# Patient Record
Sex: MALE | Race: WHITE | NOT HISPANIC OR LATINO | Employment: OTHER | URBAN - NONMETROPOLITAN AREA
[De-identification: names, ages, dates, MRNs, and addresses within clinical notes are randomized per-mention and may not be internally consistent; named-entity substitution may affect disease eponyms.]

---

## 2020-10-28 ENCOUNTER — ALLIED HEALTH/NURSE VISIT (OUTPATIENT)
Dept: FAMILY MEDICINE | Facility: OTHER | Age: 65
End: 2020-10-28
Attending: FAMILY MEDICINE
Payer: MEDICARE

## 2020-10-28 DIAGNOSIS — M79.10 MUSCLE ACHE: Primary | ICD-10-CM

## 2020-10-28 PROCEDURE — U0003 INFECTIOUS AGENT DETECTION BY NUCLEIC ACID (DNA OR RNA); SEVERE ACUTE RESPIRATORY SYNDROME CORONAVIRUS 2 (SARS-COV-2) (CORONAVIRUS DISEASE [COVID-19]), AMPLIFIED PROBE TECHNIQUE, MAKING USE OF HIGH THROUGHPUT TECHNOLOGIES AS DESCRIBED BY CMS-2020-01-R: HCPCS | Mod: ZL | Performed by: FAMILY MEDICINE

## 2020-10-28 PROCEDURE — 99207 PR NO CHARGE NURSE ONLY: CPT

## 2020-10-28 PROCEDURE — C9803 HOPD COVID-19 SPEC COLLECT: HCPCS

## 2020-10-28 NOTE — PROGRESS NOTES
Patient swabbed for COVID-19 testing.  For cough and muscle aches.  Haley Couch LPN on 10/28/2020 at 11:40 AM

## 2020-10-29 ENCOUNTER — TELEPHONE (OUTPATIENT)
Dept: EMERGENCY MEDICINE | Facility: CLINIC | Age: 65
End: 2020-10-29

## 2020-10-29 LAB
SARS-COV-2 RNA SPEC QL NAA+PROBE: ABNORMAL
SPECIMEN SOURCE: ABNORMAL

## 2020-10-29 NOTE — TELEPHONE ENCOUNTER
"Coronavirus (COVID-19) Notification    Caller Name (Patient, parent, daughter/son, grandparent, etc)  Patient     Reason for call  Notify of Positive Coronavirus (COVID-19) lab results, assess symptoms,  review [facility] recommendations    Lab Result    Lab test:  2019-nCoV rRt-PCR or SARS-CoV-2 PCR    Oropharyngeal AND/OR nasopharyngeal swabs is POSITIVE for 2019-nCoV RNA/SARS-COV-2 PCR (COVID-19 virus)    RN Recommendations/Instructions per [facility] Coronavirus COVID-19 recommendations    Brief introduction script  Introduce self then review script:  \"I am calling on behalf of [employer].  We were notified that your Coronavirus test (COVID-19) for was POSITIVE for the virus.  I have some information to relay to you but first I wanted to mention that the [place] Dept of Health will be contacting you shortly [it's possible MD already called Patient] to talk to you more about how you are feeling and other people you have had contact with who might now also have the virus.  Also, [facility] is Partnering with the [facility] for Covid-19 research, you may be contacted directly by research staff.\"    Assessment (Inquire about Patient's current symptoms)   Assessment   Current Symptoms at time of phone call: (if no symptoms, document No symptoms]  weakness, cough, SOB   Symptoms onset (if applicable)  10/24/20     If at time of call, Patients symptoms hare worsened, the Patient should contact 911 or have someone drive them to Emergency Dept promptly:      If Patient calling 911, inform 911 personal that you have tested positive for the Coronavirus (COVID-19).  Place mask on and await 911 to arrive.    If Emergency Dept, If possible, please have another adult drive you to the Emergency Dept but you need to wear mask when in contact with other people.      Review information with Patient    Your result was positive. This means you have COVID-19 (coronavirus).  We have sent you a letter that " reviews the information that I'll be reviewing with you now.    How can I protect others?    If you have symptoms: stay home and away from others (self-isolate) until:    You've had no fever--and no medicine that reduces fever--for 1 full day (24 hours). And       Your other symptoms have gotten better. For example, your cough or breathing has improved. And     At least 10 days have passed since your symptoms started. (If you've been told by a doctor that you have a weak immune system, wait 20 days.)     If you don't have symptoms: Stay home and away from others (self-isolate) until at least 10 days have passed since your first positive COVID-19 test. (Date test collected)    During this time:    Stay in your own room, including for meals. Use your own bathroom if you can.    Stay away from others in your home. No hugging, kissing or shaking hands. No visitors.     Don't go to work, school or anywhere else.     Clean  high touch  surfaces often (doorknobs, counters, handles, etc.). Use a household cleaning spray or wipes. You'll find a full list on the EPA website at www.epa.gov/pesticide-registration/list-n-disinfectants-use-against-sars-cov-2.     Cover your mouth and nose with a mask, tissue or other face covering to avoid spreading germs.    Wash your hands and face often with soap and water.    Caregivers in these groups are at risk for severe illness due to COVID-19:  o People 65 years and older  o People who live in a nursing home or long-term care facility  o People with chronic disease (lung, heart, cancer, diabetes, kidney, liver, immunologic)  o People who have a weakened immune system, including those who:  - Are in cancer treatment  - Take medicine that weakens the immune system, such as corticosteroids  - Had a bone marrow or organ transplant  - Have an immune deficiency  - Have poorly controlled HIV or AIDS  - Are obese (body mass index of 40 or higher)  - Smoke regularly    Caregivers should wear  gloves while washing dishes, handling laundry and cleaning bedrooms and bathrooms.    Wash and dry laundry with special caution. Don't shake dirty laundry, and use the warmest water setting you can.    If you have a weakened immune system, ask your doctor about other actions you should take.    For more tips, go to www.cdc.gov/coronavirus/2019-ncov/downloads/10Things.pdf.    You should not go back to work until you meet the guidelines above for ending your home isolation. You don't need to be retested for COVID-19 before going back to work--studies show that you won't spread the virus if it's been at least 10 days since your symptoms started (or 20 days, if you have a weak immune system).    Employers: This document serves as formal notice of your employee's medical guidelines for going back to work. They must meet the above guidelines before going back to work in person.    How can I take care of myself?    1. Get lots of rest. Drink extra fluids (unless a doctor has told you not to).    2. Take Tylenol (acetaminophen) for fever or pain. If you have liver or kidney problems, ask your family doctor if it's okay to take Tylenol.     Take either:     650 mg (two 325 mg pills) every 4 to 6 hours, or     1,000 mg (two 500 mg pills) every 8 hours as needed.     Note: Don't take more than 3,000 mg in one day. Acetaminophen is found in many medicines (both prescribed and over-the-counter medicines). Read all labels to be sure you don't take too much.    For children, check the Tylenol bottle for the right dose (based on their age or weight).    3. If you have other health problems (like cancer, heart failure, an organ transplant or severe kidney disease): Call your specialty clinic if you don't feel better in the next 2 days.    4. Know when to call 911: Emergency warning signs include:    Trouble breathing or shortness of breath    Pain or pressure in the chest that doesn't go away    Feeling confused like you haven't  felt before, or not being able to wake up    Bluish-colored lips or face    5. Sign up for [employer]. We know it's scary to hear that you have COVID-19. We want to track your symptoms to make sure you're okay over the next 2 weeks. Please look for an email from [employer]--this is a free, online program that we'll use to keep in touch. To sign up, follow the link in the email. Learn more at [email or web address].    Where can I get more information?    [facility]: [email or web address]    Coronavirus Basics: www.health.[facility].[place]./diseases/coronavirus/basics.html    What to Do If You're Sick: www.cdc.gov/coronavirus/2019-ncov/about/steps-when-sick.html    Ending Home Isolation: www.cdc.gov/coronavirus/2019-ncov/hcp/disposition-in-home-patients.html     Caring for Someone with COVID-19: www.cdc.gov/coronavirus/2019-ncov/if-you-are-sick/care-for-someone.html     [facility] clinical trials (COVID-19 research studies): [email or web address].[email or web address]-clinical-trials     A Positive COVID-19 letter will be sent via [employer] or the mail. (Exception, no letters sent to Presurgerical/Preprocedure Patients)    [Name]  Michelle Chino RN  [facility]  COVID19 Results Team RN  Ph# [phone]

## 2020-11-01 ENCOUNTER — NURSE TRIAGE (OUTPATIENT)
Dept: NURSING | Facility: CLINIC | Age: 65
End: 2020-11-01

## 2020-11-01 ENCOUNTER — HOSPITAL ENCOUNTER (EMERGENCY)
Facility: OTHER | Age: 65
Discharge: HOME | DRG: 177 | End: 2020-11-01
Attending: PHYSICIAN ASSISTANT | Admitting: PHYSICIAN ASSISTANT
Payer: MEDICARE

## 2020-11-01 ENCOUNTER — APPOINTMENT (OUTPATIENT)
Dept: GENERAL RADIOLOGY | Facility: OTHER | Age: 65
DRG: 177 | End: 2020-11-01
Attending: PHYSICIAN ASSISTANT
Payer: MEDICARE

## 2020-11-01 VITALS
HEART RATE: 81 BPM | WEIGHT: 220 LBS | OXYGEN SATURATION: 90 % | HEIGHT: 66 IN | RESPIRATION RATE: 22 BRPM | BODY MASS INDEX: 35.36 KG/M2 | SYSTOLIC BLOOD PRESSURE: 122 MMHG | TEMPERATURE: 98.2 F | DIASTOLIC BLOOD PRESSURE: 88 MMHG

## 2020-11-01 DIAGNOSIS — U07.1 2019 NOVEL CORONAVIRUS DISEASE (COVID-19): ICD-10-CM

## 2020-11-01 DIAGNOSIS — J18.9 PNEUMONIA: ICD-10-CM

## 2020-11-01 LAB
ANION GAP SERPL CALCULATED.3IONS-SCNC: 8 MMOL/L (ref 3–14)
APTT PPP: 32 SEC (ref 22–37)
BASOPHILS # BLD AUTO: 0 10E9/L (ref 0–0.2)
BASOPHILS NFR BLD AUTO: 0.6 %
BUN SERPL-MCNC: 13 MG/DL (ref 7–25)
CALCIUM SERPL-MCNC: 8.2 MG/DL (ref 8.6–10.3)
CHLORIDE SERPL-SCNC: 100 MMOL/L (ref 98–107)
CO2 SERPL-SCNC: 27 MMOL/L (ref 21–31)
CREAT SERPL-MCNC: 0.9 MG/DL (ref 0.7–1.3)
D DIMER PPP FEU-MCNC: 0.5 UG/ML FEU (ref 0–0.5)
DIFFERENTIAL METHOD BLD: NORMAL
EOSINOPHIL # BLD AUTO: 0 10E9/L (ref 0–0.7)
EOSINOPHIL NFR BLD AUTO: 0.1 %
ERYTHROCYTE [DISTWIDTH] IN BLOOD BY AUTOMATED COUNT: 12.6 % (ref 10–15)
GFR SERPL CREATININE-BSD FRML MDRD: 84 ML/MIN/{1.73_M2}
GLUCOSE SERPL-MCNC: 108 MG/DL (ref 70–105)
HCT VFR BLD AUTO: 42.7 % (ref 40–53)
HGB BLD-MCNC: 14.7 G/DL (ref 13.3–17.7)
IMM GRANULOCYTES # BLD: 0 10E9/L (ref 0–0.4)
IMM GRANULOCYTES NFR BLD: 0.6 %
INR PPP: 1.03 (ref 0.86–1.14)
LYMPHOCYTES # BLD AUTO: 1.8 10E9/L (ref 0.8–5.3)
LYMPHOCYTES NFR BLD AUTO: 26.7 %
MCH RBC QN AUTO: 32.7 PG (ref 26.5–33)
MCHC RBC AUTO-ENTMCNC: 34.4 G/DL (ref 31.5–36.5)
MCV RBC AUTO: 95 FL (ref 78–100)
MONOCYTES # BLD AUTO: 0.5 10E9/L (ref 0–1.3)
MONOCYTES NFR BLD AUTO: 6.5 %
NEUTROPHILS # BLD AUTO: 4.5 10E9/L (ref 1.6–8.3)
NEUTROPHILS NFR BLD AUTO: 65.5 %
PLATELET # BLD AUTO: 182 10E9/L (ref 150–450)
POTASSIUM SERPL-SCNC: 3.3 MMOL/L (ref 3.5–5.1)
RBC # BLD AUTO: 4.49 10E12/L (ref 4.4–5.9)
SODIUM SERPL-SCNC: 135 MMOL/L (ref 134–144)
TROPONIN I SERPL-MCNC: 12.2 PG/ML
WBC # BLD AUTO: 6.9 10E9/L (ref 4–11)

## 2020-11-01 PROCEDURE — 93005 ELECTROCARDIOGRAM TRACING: CPT | Performed by: PHYSICIAN ASSISTANT

## 2020-11-01 PROCEDURE — 80048 BASIC METABOLIC PNL TOTAL CA: CPT | Performed by: PHYSICIAN ASSISTANT

## 2020-11-01 PROCEDURE — 250N000013 HC RX MED GY IP 250 OP 250 PS 637: Performed by: PHYSICIAN ASSISTANT

## 2020-11-01 PROCEDURE — 85025 COMPLETE CBC W/AUTO DIFF WBC: CPT | Performed by: PHYSICIAN ASSISTANT

## 2020-11-01 PROCEDURE — 85610 PROTHROMBIN TIME: CPT | Performed by: PHYSICIAN ASSISTANT

## 2020-11-01 PROCEDURE — 99283 EMERGENCY DEPT VISIT LOW MDM: CPT | Performed by: PHYSICIAN ASSISTANT

## 2020-11-01 PROCEDURE — 85379 FIBRIN DEGRADATION QUANT: CPT | Performed by: PHYSICIAN ASSISTANT

## 2020-11-01 PROCEDURE — 258N000003 HC RX IP 258 OP 636: Performed by: PHYSICIAN ASSISTANT

## 2020-11-01 PROCEDURE — 84484 ASSAY OF TROPONIN QUANT: CPT | Performed by: PHYSICIAN ASSISTANT

## 2020-11-01 PROCEDURE — 96375 TX/PRO/DX INJ NEW DRUG ADDON: CPT | Performed by: PHYSICIAN ASSISTANT

## 2020-11-01 PROCEDURE — 85730 THROMBOPLASTIN TIME PARTIAL: CPT | Performed by: PHYSICIAN ASSISTANT

## 2020-11-01 PROCEDURE — 71045 X-RAY EXAM CHEST 1 VIEW: CPT

## 2020-11-01 PROCEDURE — 93010 ELECTROCARDIOGRAM REPORT: CPT | Performed by: INTERNAL MEDICINE

## 2020-11-01 PROCEDURE — 36415 COLL VENOUS BLD VENIPUNCTURE: CPT | Performed by: PHYSICIAN ASSISTANT

## 2020-11-01 PROCEDURE — 96365 THER/PROPH/DIAG IV INF INIT: CPT | Performed by: PHYSICIAN ASSISTANT

## 2020-11-01 PROCEDURE — 99285 EMERGENCY DEPT VISIT HI MDM: CPT | Mod: 25 | Performed by: PHYSICIAN ASSISTANT

## 2020-11-01 PROCEDURE — 250N000011 HC RX IP 250 OP 636: Performed by: PHYSICIAN ASSISTANT

## 2020-11-01 RX ORDER — LOSARTAN POTASSIUM AND HYDROCHLOROTHIAZIDE 12.5; 1 MG/1; MG/1
1 TABLET ORAL DAILY
COMMUNITY

## 2020-11-01 RX ORDER — CEFTRIAXONE SODIUM 1 G/50ML
1 INJECTION, SOLUTION INTRAVENOUS ONCE
Status: COMPLETED | OUTPATIENT
Start: 2020-11-01 | End: 2020-11-01

## 2020-11-01 RX ORDER — AZITHROMYCIN 250 MG/1
250 TABLET, FILM COATED ORAL DAILY
Qty: 6 TABLET | Refills: 0 | Status: ON HOLD | OUTPATIENT
Start: 2020-11-01 | End: 2020-11-08

## 2020-11-01 RX ORDER — ATENOLOL 50 MG/1
50 TABLET ORAL DAILY
COMMUNITY

## 2020-11-01 RX ORDER — DEXAMETHASONE SODIUM PHOSPHATE 10 MG/ML
6 INJECTION, SOLUTION INTRAMUSCULAR; INTRAVENOUS ONCE
Status: COMPLETED | OUTPATIENT
Start: 2020-11-01 | End: 2020-11-01

## 2020-11-01 RX ORDER — AZITHROMYCIN 250 MG/1
500 TABLET, FILM COATED ORAL ONCE
Status: COMPLETED | OUTPATIENT
Start: 2020-11-01 | End: 2020-11-01

## 2020-11-01 RX ADMIN — AZITHROMYCIN MONOHYDRATE 500 MG: 250 TABLET ORAL at 13:54

## 2020-11-01 RX ADMIN — SODIUM CHLORIDE 1000 ML: 9 INJECTION, SOLUTION INTRAVENOUS at 13:45

## 2020-11-01 RX ADMIN — CEFTRIAXONE SODIUM 1 G: 1 INJECTION, SOLUTION INTRAVENOUS at 13:48

## 2020-11-01 RX ADMIN — DEXAMETHASONE SODIUM PHOSPHATE 6 MG: 10 INJECTION, SOLUTION INTRAMUSCULAR; INTRAVENOUS at 13:53

## 2020-11-01 ASSESSMENT — ENCOUNTER SYMPTOMS
BRUISES/BLEEDS EASILY: 0
FEVER: 0
SHORTNESS OF BREATH: 1
CONFUSION: 0
HEMATURIA: 0
CHILLS: 0
ADENOPATHY: 0
WOUND: 0
CHEST TIGHTNESS: 0
ABDOMINAL PAIN: 0
BACK PAIN: 0

## 2020-11-01 ASSESSMENT — MIFFLIN-ST. JEOR: SCORE: 1720.66

## 2020-11-01 NOTE — TELEPHONE ENCOUNTER
Reason for Disposition    MODERATE difficulty breathing (e.g., speaks in phrases, SOB even at rest, pulse 100-120)    Additional Information    Negative: SEVERE difficulty breathing (e.g., struggling for each breath, speaks in single words)    Negative: Difficult to awaken or acting confused (e.g., disoriented, slurred speech)    Negative: Bluish (or gray) lips or face now    Negative: Shock suspected (e.g., cold/pale/clammy skin, too weak to stand, low BP, rapid pulse)    Negative: Sounds like a life-threatening emergency to the triager    Negative: [1] COVID-19 exposure AND [2] no symptoms    Negative: COVID-19 and Breastfeeding, questions about    Negative: [1] Adult with possible COVID-19 symptoms AND [2] triager concerned about severity of symptoms or other causes    Negative: SEVERE or constant chest pain or pressure (Exception: mild central chest pain, present only when coughing)     [facility] [place] Specific Disposition  - [facility] Specific Patient Instructions  COVID 19 Nurse Triage Plan/Patient Instructions    Please be aware that novel coronavirus (COVID-19) may be circulating in the community. If you develop symptoms such as fever, cough, or SOB or if you have concerns about the presence of another infection including coronavirus (COVID-19), please contact your health care provider or visit [email or web address].       ED Visit recommended. Follow protocol based instructions.     Bring Your Own Device:  Please also bring your smart device(s) (smart phones, tablets, laptops) and their charging cables for your personal use and to communicate with your care team during your visit.    Thank you for taking steps to prevent the spread of this virus.  Limit your contact with others.  Wear a simple mask to cover your cough.  Wash your hands well and often.    Resources  [facility]: About COVID-19: [email or web address]  CDC: What to Do If You're Sick:  "www.cdc.gov/coronavirus/2019-ncov/about/steps-when-sick.html  CDC: Ending Home Isolation: www.cdc.gov/coronavirus/2019-ncov/hcp/disposition-in-home-patients.html   CDC: Caring for Someone: www.cdc.gov/coronavirus/2019-ncov/if-you-are-sick/care-for-someone.html   [facility]: Interim Guidance for Hospital Discharge to Home: www.health.[facility].[place]./diseases/coronavirus/hcp/hospdischarge.pdf  [facility] clinical trials (COVID-19 research studies): clinicalaffairs.[facility]-clinical-trials   Below are the COVID-19 hotlines at the [place] Department of Health ([facility]). Interpreters are available.   For health questions: Call 140-825-3730 or 1-179.728.1444 (7 a.m. to 7 p.m.)  For questions about schools and childcare: Call 595-260-2025 or 1-150.904.6701 (7 a.m. to 7 p.m.)    Answer Assessment - Initial Assessment Questions  1. COVID-19 DIAGNOSIS: \"Who made your Coronavirus (COVID-19) diagnosis?\" \"Was it confirmed by a positive lab test?\" If not diagnosed by a HCP, ask \"Are there lots of cases (community spread) where you live?\" (See public health department website, if unsure)      positve  2. ONSET: \"When did the COVID-19 symptoms start?\"       Few days  3. WORST SYMPTOM: \"What is your worst symptom?\" (e.g., cough, fever, shortness of breath, muscle aches)      Short of breath  4. COUGH: \"Do you have a cough?\" If so, ask: \"How bad is the cough?\"        yes  5. FEVER: \"Do you have a fever?\" If so, ask: \"What is your temperature, how was it measured, and when did it start?\"      yes  6. RESPIRATORY STATUS: \"Describe your breathing?\" (e.g., shortness of breath, wheezing, unable to speak)      short of breath, speaking without difficulty, but describes worsening symptoms  7. BETTER-SAME-WORSE: \"Are you getting better, staying the same or getting worse compared to yesterday?\"  If getting worse, ask, \"In what way?\"      worse  8. HIGH RISK DISEASE: \"Do you have any chronic medical problems?\" (e.g., asthma, heart or lung " "disease, weak immune system, etc.)      yes  9. PREGNANCY: \"Is there any chance you are pregnant?\" \"When was your last menstrual period?\"      no  10. OTHER SYMPTOMS: \"Do you have any other symptoms?\"  (e.g., chills, fatigue, headache, loss of smell or taste, muscle pain, sore throat)        no    Protocols used: CORONAVIRUS (COVID-19) DIAGNOSED OR [id number] 8.4.20      "

## 2020-11-01 NOTE — ED TRIAGE NOTES
"ED Nursing Triage Note (General)   ________________________________    Remington Cook is a 65 year old Male that presents to triage private car  With history of  Possitive Covid reported by patient   Significant symptoms had onset Nov 28. Increased SOB reported and difficulty sleeping due to breathing. Also reports cough and mild weakness. No hx of respiratory concerns.  Eating and drinking fine.,  /88   Pulse 83   Temp 98.2  F (36.8  C) (Tympanic)   Resp 22   Ht 1.676 m (5' 6\")   Wt 99.8 kg (220 lb)   SpO2 92%   BMI 35.51 kg/m  t  Patient appears alert  and oriented, in no acute distress., and cooperative and calm behavior.    GCS 15  Airway: intact  Breathing noted as Normal.  Circulation Normal  Skin normal        "

## 2020-11-01 NOTE — ED AVS SNAPSHOT
[facility]  [address]  [place] [address]  Phone: [phone]  Fax: [phone]                                    Remington Cook   MRN: [id number]    Department: [facility]   Date of Visit: 11/1/2020           After Visit Summary Signature Page    I have received my discharge instructions, and my questions have been answered. I have discussed any challenges I see with this plan with the nurse or doctor.    ..........................................................................................................................................  Patient/Patient Representative Signature      ..........................................................................................................................................  Patient Representative Print Name and Relationship to Patient    ..................................................               ................................................  Date                                   Time    ..........................................................................................................................................  Reviewed by Signature/Title    ...................................................              ..............................................  Date                                               Time          22EPIC Rev 08/18

## 2020-11-01 NOTE — DISCHARGE INSTRUCTIONS
Get plenty of fluids and rest.  As we discussed all of your lab work and vital signs appear very well as does your physical exam.  However, your chest x-ray did show pneumonia.  It is difficult to determine if this is from the coronavirus or if there may be an underlying bacterial pneumonia.  We will treat you with a Z-Jim.  You are given the first dose in the emergency department which is 500 mg.  You should only take 250 mg, 1 tablet/day starting 11/2 for a total of 4 days.  This will ED with 2 doses left over.  Currently things look well but if your symptoms are greatly worsening or concerning please return for further evaluation, I would expect your Covid symptoms to be present for approximately 2 weeks.

## 2020-11-01 NOTE — ED PROVIDER NOTES
"  History     Chief Complaint   Patient presents with     Shortness of Breath     HPI  Remington Cook is a 65 year old male who presents to the ED for evaluation of SOB. Reporting a dry nonproductive cough over the last few days, just had a returned positive COVID test. He says he wakes up in the middle of the night feeling a little more SOB than usual. Unknown fevers. No chest pain, abd pain, dysuria. Still eating and drinking appropriately.     Allergies:  No Known Allergies    Problem List:    There are no active problems to display for this patient.       Past Medical History:    History reviewed. No pertinent past medical history.    Past Surgical History:    History reviewed. No pertinent surgical history.    Family History:    History reviewed. No pertinent family history.    Social History:  Marital Status:  Single [1]  Social History     Tobacco Use     Smoking status: Former Smoker     Smokeless tobacco: Never Used   Substance Use Topics     Alcohol use: Not Currently     Drug use: Not Currently        Medications:         atenolol (TENORMIN) 50 MG tablet       azithromycin (ZITHROMAX) 250 MG tablet       losartan-hydrochlorothiazide (HYZAAR) 100-12.5 MG tablet          Review of Systems   Constitutional: Negative for chills and fever.   HENT: Negative for congestion.    Eyes: Negative for visual disturbance.   Respiratory: Positive for shortness of breath. Negative for chest tightness.    Cardiovascular: Negative for chest pain.   Gastrointestinal: Negative for abdominal pain.   Genitourinary: Negative for hematuria.   Musculoskeletal: Negative for back pain.   Skin: Negative for rash and wound.   Neurological: Negative for syncope.   Hematological: Negative for adenopathy. Does not bruise/bleed easily.   Psychiatric/Behavioral: Negative for confusion.       Physical Exam   BP: 122/88  Pulse: 83  Temp: 98.2  F (36.8  C)  Resp: 22  Height: 167.6 cm (5' 6\")  Weight: 99.8 kg (220 lb)  SpO2: 92 %      Physical " Exam  Constitutional:       General: He is not in acute distress.     Appearance: He is well-developed. He is not diaphoretic.   HENT:      Head: Normocephalic and atraumatic.   Eyes:      General: No scleral icterus.     Conjunctiva/sclera: Conjunctivae normal.   Neck:      Musculoskeletal: Neck supple.   Cardiovascular:      Rate and Rhythm: Normal rate and regular rhythm.   Pulmonary:      Effort: Pulmonary effort is normal.      Breath sounds: Normal breath sounds. No decreased breath sounds, wheezing, rhonchi or rales.   Abdominal:      Palpations: Abdomen is soft.      Tenderness: There is no abdominal tenderness.   Musculoskeletal:         General: No deformity.   Lymphadenopathy:      Cervical: No cervical adenopathy.   Skin:     General: Skin is warm and dry.      Findings: No rash.   Neurological:      Mental Status: He is alert and oriented to person, place, and time. Mental status is at baseline.   Psychiatric:         Mood and Affect: Mood normal.         Behavior: Behavior normal.         ED Course        Procedures          EKG read at 1341.  Heart rate 78, normal sinus rhythm, no ST changes.    Critical Care time:  none               Results for orders placed or performed during the hospital encounter of 11/01/20 (from the past 24 hour(s))   CBC with platelets differential   Result Value Ref Range    WBC 6.9 4.0 - 11.0 10e9/L    RBC Count 4.49 4.4 - 5.9 10e12/L    Hemoglobin 14.7 13.3 - 17.7 g/dL    Hematocrit 42.7 40.0 - 53.0 %    MCV 95 78 - 100 fl    MCH 32.7 26.5 - 33.0 pg    MCHC 34.4 31.5 - 36.5 g/dL    RDW 12.6 10.0 - 15.0 %    Platelet Count 182 150 - 450 10e9/L    Diff Method Automated Method     % Neutrophils 65.5 %    % Lymphocytes 26.7 %    % Monocytes 6.5 %    % Eosinophils 0.1 %    % Basophils 0.6 %    % Immature Granulocytes 0.6 %    Absolute Neutrophil 4.5 1.6 - 8.3 10e9/L    Absolute Lymphocytes 1.8 0.8 - 5.3 10e9/L    Absolute Monocytes 0.5 0.0 - 1.3 10e9/L    Absolute Eosinophils  0.0 0.0 - 0.7 10e9/L    Absolute Basophils 0.0 0.0 - 0.2 10e9/L    Abs Immature Granulocytes 0.0 0 - 0.4 10e9/L   Basic metabolic panel   Result Value Ref Range    Sodium 135 134 - 144 mmol/L    Potassium 3.3 (L) 3.5 - 5.1 mmol/L    Chloride 100 98 - 107 mmol/L    Carbon Dioxide 27 21 - 31 mmol/L    Anion Gap 8 3 - 14 mmol/L    Glucose 108 (H) 70 - 105 mg/dL    Urea Nitrogen 13 7 - 25 mg/dL    Creatinine 0.90 0.70 - 1.30 mg/dL    GFR Estimate 84 >60 mL/min/[1.73_m2]    GFR Estimate If Black >90 >60 mL/min/[1.73_m2]    Calcium 8.2 (L) 8.6 - 10.3 mg/dL   Troponin GH   Result Value Ref Range    Troponin 12.2 <34.0 pg/mL   D dimer quantitative   Result Value Ref Range    D Dimer 0.5 0.0 - 0.50 ug/ml FEU   INR   Result Value Ref Range    INR 1.03 0.86 - 1.14   Partial thromboplastin time   Result Value Ref Range    PTT 32 22 - 37 sec   XR Chest Port 1 View    Narrative    PROCEDURE:  XR CHEST PORT 1 VW    HISTORY:  + covid, inc sob, cough.     COMPARISON:  None.    FINDINGS:   The heart is borderline in size. The pulmonary vasculature is normal.   There are abnormal areas of increased density in both lungs suspicious  for pneumonia. No pleural effusion or pneumothorax.      Impression    IMPRESSION:  Bilateral pulmonary infiltrates consistent with pneumonia       DAVID LEWIS MD       Medications   cefTRIAXone in d5w (ROCEPHIN) intermittent infusion 1 g (0 g Intravenous Stopped 11/1/20 1428)   azithromycin (ZITHROMAX) tablet 500 mg (500 mg Oral Given 11/1/20 1354)   dexamethasone PF (DECADRON) injection 6 mg (6 mg Intravenous Given 11/1/20 1353)   0.9% sodium chloride BOLUS (0 mLs Intravenous Stopped 11/1/20 1428)       Assessments & Plan (with Medical Decision Making)   Pt nontoxic in NAD. Heart, lung, bowel sounds normal, abd soft, nontender to palpation, nondistended. VSS, afebrile. He is COVID +.    He has very well-appearing lab work with no leukocytosis, normal hemoglobin.  He has a negative troponin, normal  D-dimer.  BMP is unremarkable.    Chest x-ray is read as Bilateral pulmonary infiltrates consistent with pneumonia.    Overall I am encouraged by the well appearance of the patient as well as his stable vital signs and reassuring diagnostic studies.  He is not requiring any supplemental oxygen in the emergency department.  At this time I do not see any benefit from being admitted to the hospital.    Patient was given Rocephin and will be sent home on a Z-Jim.  He was given a dose of dexamethasone.    Strict return precautions are given to the pt, they will return if symptoms are worsening or concerning. The pt understands and agrees with the plan and they are discharged.     Tin Cast PA-C    I have reviewed the nursing notes.    I have reviewed the findings, diagnosis, plan and need for follow up with the patient.       Discharge Medication List as of 11/1/2020  2:29 PM      START taking these medications    Details   azithromycin (ZITHROMAX) 250 MG tablet Take 1 tablet (250 mg) by mouth daily Take 1 tablet per day. 250mg/day starting on 11/2, take for 4 days. You will have 2 left over pills., Disp-6 tablet, R-0, [facility]             Final diagnoses:   2019 novel coronavirus disease (COVID-19)   Pneumonia       11/1/2020   [facility]     Tin Cast PA  11/01/20 [id number]

## 2020-11-02 LAB — INTERPRETATION ECG - MUSE: NORMAL

## 2020-11-03 ENCOUNTER — HOSPITAL ENCOUNTER (INPATIENT)
Facility: OTHER | Age: 65
LOS: 5 days | Discharge: HOME | DRG: 177 | End: 2020-11-08
Attending: FAMILY MEDICINE | Admitting: FAMILY MEDICINE
Payer: MEDICARE

## 2020-11-03 ENCOUNTER — APPOINTMENT (OUTPATIENT)
Dept: GENERAL RADIOLOGY | Facility: OTHER | Age: 65
DRG: 177 | End: 2020-11-03
Attending: FAMILY MEDICINE
Payer: MEDICARE

## 2020-11-03 DIAGNOSIS — E66.9 OBESITY, UNSPECIFIED CLASSIFICATION, UNSPECIFIED OBESITY TYPE, UNSPECIFIED WHETHER SERIOUS COMORBIDITY PRESENT: ICD-10-CM

## 2020-11-03 DIAGNOSIS — U07.1 2019 NOVEL CORONAVIRUS DISEASE (COVID-19): Primary | ICD-10-CM

## 2020-11-03 DIAGNOSIS — Z79.899 NEED FOR PROPHYLACTIC CHEMOTHERAPY: ICD-10-CM

## 2020-11-03 DIAGNOSIS — U07.1 PNEUMONIA DUE TO 2019 NOVEL CORONAVIRUS: ICD-10-CM

## 2020-11-03 DIAGNOSIS — R09.02 HYPOXIA: ICD-10-CM

## 2020-11-03 DIAGNOSIS — Z87.891 PERSONAL HISTORY OF TOBACCO USE, PRESENTING HAZARDS TO HEALTH: ICD-10-CM

## 2020-11-03 DIAGNOSIS — Z20.822 COVID-19 RULED OUT: ICD-10-CM

## 2020-11-03 DIAGNOSIS — J12.82 PNEUMONIA DUE TO 2019 NOVEL CORONAVIRUS: ICD-10-CM

## 2020-11-03 DIAGNOSIS — E66.9 OBESITY (BMI 35.0-39.9 WITHOUT COMORBIDITY): ICD-10-CM

## 2020-11-03 DIAGNOSIS — R09.02 HYPOXEMIA: ICD-10-CM

## 2020-11-03 LAB
ABO + RH BLD: NORMAL
ABO + RH BLD: NORMAL
ALBUMIN SERPL-MCNC: 3.5 G/DL (ref 3.5–5.7)
ALBUMIN UR-MCNC: 10 MG/DL
ALP SERPL-CCNC: 53 U/L (ref 34–104)
ALT SERPL W P-5'-P-CCNC: 35 U/L (ref 7–52)
ANION GAP SERPL CALCULATED.3IONS-SCNC: 10 MMOL/L (ref 3–14)
APPEARANCE UR: CLEAR
APTT PPP: 31 SEC (ref 22–37)
APTT PPP: 31 SEC (ref 22–37)
AST SERPL W P-5'-P-CCNC: 49 U/L (ref 13–39)
BASOPHILS # BLD AUTO: 0 10E9/L (ref 0–0.2)
BASOPHILS NFR BLD AUTO: 0.2 %
BILIRUB SERPL-MCNC: 0.6 MG/DL (ref 0.3–1)
BILIRUB UR QL STRIP: NEGATIVE
BUN SERPL-MCNC: 15 MG/DL (ref 7–25)
CALCIUM SERPL-MCNC: 8.8 MG/DL (ref 8.6–10.3)
CHLORIDE SERPL-SCNC: 100 MMOL/L (ref 98–107)
CK SERPL-CCNC: 279 U/L (ref 30–223)
CO2 SERPL-SCNC: 27 MMOL/L (ref 21–31)
COLOR UR AUTO: ABNORMAL
CREAT SERPL-MCNC: 0.95 MG/DL (ref 0.7–1.3)
CRP SERPL-MCNC: 63.2 MG/L
D DIMER PPP FEU-MCNC: 0.5 UG/ML FEU (ref 0–0.5)
DIFFERENTIAL METHOD BLD: ABNORMAL
EOSINOPHIL # BLD AUTO: 0 10E9/L (ref 0–0.7)
EOSINOPHIL NFR BLD AUTO: 0.1 %
ERYTHROCYTE [DISTWIDTH] IN BLOOD BY AUTOMATED COUNT: 12.5 % (ref 10–15)
FERRITIN SERPL-MCNC: 1340 NG/ML (ref 23.9–336.2)
FIBRINOGEN PPP-MCNC: 693 MG/DL (ref 200–420)
GFR SERPL CREATININE-BSD FRML MDRD: 79 ML/MIN/{1.73_M2}
GLUCOSE SERPL-MCNC: 105 MG/DL (ref 70–105)
GLUCOSE UR STRIP-MCNC: NEGATIVE MG/DL
HCO3 BLD-SCNC: 26 MMOL/L (ref 21–28)
HCO3 BLDV-SCNC: 28 MMOL/L (ref 21–28)
HCT VFR BLD AUTO: 43.4 % (ref 40–53)
HGB BLD-MCNC: 14.7 G/DL (ref 13.3–17.7)
HGB UR QL STRIP: ABNORMAL
HYALINE CASTS #/AREA URNS LPF: 1 /LPF (ref 0–2)
IMM GRANULOCYTES # BLD: 0.1 10E9/L (ref 0–0.4)
IMM GRANULOCYTES NFR BLD: 0.6 %
INR PPP: 1.01 (ref 0.86–1.14)
KETONES UR STRIP-MCNC: NEGATIVE MG/DL
LACTATE BLD-SCNC: 1.9 MMOL/L (ref 0.7–2)
LDH SERPL L TO P-CCNC: 374 U/L (ref 140–271)
LEUKOCYTE ESTERASE UR QL STRIP: NEGATIVE
LYMPHOCYTES # BLD AUTO: 1.5 10E9/L (ref 0.8–5.3)
LYMPHOCYTES NFR BLD AUTO: 13.3 %
MCH RBC QN AUTO: 32.2 PG (ref 26.5–33)
MCHC RBC AUTO-ENTMCNC: 33.9 G/DL (ref 31.5–36.5)
MCV RBC AUTO: 95 FL (ref 78–100)
MONOCYTES # BLD AUTO: 0.5 10E9/L (ref 0–1.3)
MONOCYTES NFR BLD AUTO: 4.7 %
MUCOUS THREADS #/AREA URNS LPF: PRESENT /LPF
NEUTROPHILS # BLD AUTO: 8.9 10E9/L (ref 1.6–8.3)
NEUTROPHILS NFR BLD AUTO: 81.1 %
NITRATE UR QL: NEGATIVE
NT-PROBNP SERPL-MCNC: 62 PG/ML (ref 0–100)
O2/TOTAL GAS SETTING VFR VENT: 0 %
O2/TOTAL GAS SETTING VFR VENT: 60 %
OXYHGB MFR BLD: 89 % (ref 92–100)
OXYHGB MFR BLDV: 52 %
PCO2 BLD: 35 MM HG (ref 35–45)
PCO2 BLDV: 40 MM HG (ref 40–50)
PH BLD: 7.47 PH (ref 7.35–7.45)
PH BLDV: 7.46 PH (ref 7.32–7.43)
PH UR STRIP: 6.5 PH (ref 5–7)
PLATELET # BLD AUTO: 268 10E9/L (ref 150–450)
PO2 BLD: 56 MM HG (ref 80–105)
PO2 BLDV: 28 MM HG (ref 25–47)
POTASSIUM SERPL-SCNC: 3.3 MMOL/L (ref 3.5–5.1)
PROCALCITONIN SERPL-MCNC: <0.5 NG/ML
PROT SERPL-MCNC: 7.3 G/DL (ref 6.4–8.9)
RBC # BLD AUTO: 4.57 10E12/L (ref 4.4–5.9)
RBC #/AREA URNS AUTO: 4 /HPF (ref 0–2)
RETICS # AUTO: 31.4 10E9/L (ref 25–95)
RETICS/RBC NFR AUTO: 0.7 % (ref 0.5–2)
SODIUM SERPL-SCNC: 137 MMOL/L (ref 134–144)
SOURCE: ABNORMAL
SP GR UR STRIP: 1.01 (ref 1–1.03)
SPECIMEN EXP DATE BLD: NORMAL
TROPONIN I SERPL-MCNC: 9.8 PG/ML
UROBILINOGEN UR STRIP-MCNC: NORMAL MG/DL (ref 0–2)
WBC # BLD AUTO: 11 10E9/L (ref 4–11)
WBC #/AREA URNS AUTO: 1 /HPF (ref 0–5)

## 2020-11-03 PROCEDURE — 83880 ASSAY OF NATRIURETIC PEPTIDE: CPT | Performed by: FAMILY MEDICINE

## 2020-11-03 PROCEDURE — 83615 LACTATE (LD) (LDH) ENZYME: CPT | Performed by: FAMILY MEDICINE

## 2020-11-03 PROCEDURE — 85730 THROMBOPLASTIN TIME PARTIAL: CPT | Performed by: FAMILY MEDICINE

## 2020-11-03 PROCEDURE — 36415 COLL VENOUS BLD VENIPUNCTURE: CPT | Performed by: FAMILY MEDICINE

## 2020-11-03 PROCEDURE — 80053 COMPREHEN METABOLIC PANEL: CPT | Performed by: FAMILY MEDICINE

## 2020-11-03 PROCEDURE — 999N000157 HC STATISTIC RCP TIME EA 10 MIN

## 2020-11-03 PROCEDURE — 94640 AIRWAY INHALATION TREATMENT: CPT | Mod: 76

## 2020-11-03 PROCEDURE — 86900 BLOOD TYPING SEROLOGIC ABO: CPT | Performed by: FAMILY MEDICINE

## 2020-11-03 PROCEDURE — 84145 PROCALCITONIN (PCT): CPT | Performed by: FAMILY MEDICINE

## 2020-11-03 PROCEDURE — 85379 FIBRIN DEGRADATION QUANT: CPT | Performed by: FAMILY MEDICINE

## 2020-11-03 PROCEDURE — 85025 COMPLETE CBC W/AUTO DIFF WBC: CPT | Performed by: FAMILY MEDICINE

## 2020-11-03 PROCEDURE — 36600 WITHDRAWAL OF ARTERIAL BLOOD: CPT | Performed by: FAMILY MEDICINE

## 2020-11-03 PROCEDURE — 82805 BLOOD GASES W/O2 SATURATION: CPT | Performed by: FAMILY MEDICINE

## 2020-11-03 PROCEDURE — 96366 THER/PROPH/DIAG IV INF ADDON: CPT | Performed by: FAMILY MEDICINE

## 2020-11-03 PROCEDURE — 83520 IMMUNOASSAY QUANT NOS NONAB: CPT | Performed by: FAMILY MEDICINE

## 2020-11-03 PROCEDURE — 258N000003 HC RX IP 258 OP 636: Performed by: FAMILY MEDICINE

## 2020-11-03 PROCEDURE — 85300 ANTITHROMBIN III ACTIVITY: CPT | Performed by: FAMILY MEDICINE

## 2020-11-03 PROCEDURE — 96375 TX/PRO/DX INJ NEW DRUG ADDON: CPT | Performed by: FAMILY MEDICINE

## 2020-11-03 PROCEDURE — 93005 ELECTROCARDIOGRAM TRACING: CPT | Performed by: FAMILY MEDICINE

## 2020-11-03 PROCEDURE — 96365 THER/PROPH/DIAG IV INF INIT: CPT | Performed by: FAMILY MEDICINE

## 2020-11-03 PROCEDURE — 93010 ELECTROCARDIOGRAM REPORT: CPT | Performed by: INTERNAL MEDICINE

## 2020-11-03 PROCEDURE — 71045 X-RAY EXAM CHEST 1 VIEW: CPT

## 2020-11-03 PROCEDURE — 99223 1ST HOSP IP/OBS HIGH 75: CPT | Performed by: FAMILY MEDICINE

## 2020-11-03 PROCEDURE — 83605 ASSAY OF LACTIC ACID: CPT | Performed by: FAMILY MEDICINE

## 2020-11-03 PROCEDURE — XW033E5 INTRODUCTION OF REMDESIVIR ANTI-INFECTIVE INTO PERIPHERAL VEIN, PERCUTANEOUS APPROACH, NEW TECHNOLOGY GROUP 5: ICD-10-PCS | Performed by: FAMILY MEDICINE

## 2020-11-03 PROCEDURE — 200N000001 HC R&B ICU

## 2020-11-03 PROCEDURE — 250N000011 HC RX IP 250 OP 636: Performed by: FAMILY MEDICINE

## 2020-11-03 PROCEDURE — 94640 AIRWAY INHALATION TREATMENT: CPT

## 2020-11-03 PROCEDURE — 86140 C-REACTIVE PROTEIN: CPT | Performed by: FAMILY MEDICINE

## 2020-11-03 PROCEDURE — 82728 ASSAY OF FERRITIN: CPT | Performed by: FAMILY MEDICINE

## 2020-11-03 PROCEDURE — 85045 AUTOMATED RETICULOCYTE COUNT: CPT | Performed by: FAMILY MEDICINE

## 2020-11-03 PROCEDURE — 84484 ASSAY OF TROPONIN QUANT: CPT | Performed by: FAMILY MEDICINE

## 2020-11-03 PROCEDURE — 99285 EMERGENCY DEPT VISIT HI MDM: CPT | Mod: 25 | Performed by: FAMILY MEDICINE

## 2020-11-03 PROCEDURE — 86901 BLOOD TYPING SEROLOGIC RH(D): CPT | Performed by: FAMILY MEDICINE

## 2020-11-03 PROCEDURE — 250N000009 HC RX 250: Performed by: FAMILY MEDICINE

## 2020-11-03 PROCEDURE — 250N000013 HC RX MED GY IP 250 OP 250 PS 637: Performed by: FAMILY MEDICINE

## 2020-11-03 PROCEDURE — 85384 FIBRINOGEN ACTIVITY: CPT | Performed by: FAMILY MEDICINE

## 2020-11-03 PROCEDURE — 82550 ASSAY OF CK (CPK): CPT | Performed by: FAMILY MEDICINE

## 2020-11-03 PROCEDURE — 85610 PROTHROMBIN TIME: CPT | Performed by: FAMILY MEDICINE

## 2020-11-03 PROCEDURE — 99285 EMERGENCY DEPT VISIT HI MDM: CPT | Performed by: FAMILY MEDICINE

## 2020-11-03 PROCEDURE — A9270 NON-COVERED ITEM OR SERVICE: HCPCS | Performed by: FAMILY MEDICINE

## 2020-11-03 PROCEDURE — 81001 URINALYSIS AUTO W/SCOPE: CPT | Performed by: FAMILY MEDICINE

## 2020-11-03 RX ORDER — DEXAMETHASONE SODIUM PHOSPHATE 4 MG/ML
6 INJECTION, SOLUTION INTRA-ARTICULAR; INTRALESIONAL; INTRAMUSCULAR; INTRAVENOUS; SOFT TISSUE ONCE
Status: COMPLETED | OUTPATIENT
Start: 2020-11-03 | End: 2020-11-03

## 2020-11-03 RX ORDER — SODIUM CHLORIDE 9 MG/ML
INJECTION, SOLUTION INTRAVENOUS CONTINUOUS
Status: DISCONTINUED | OUTPATIENT
Start: 2020-11-03 | End: 2020-11-03

## 2020-11-03 RX ORDER — ALBUTEROL SULFATE 90 UG/1
2 AEROSOL, METERED RESPIRATORY (INHALATION) EVERY 4 HOURS PRN
Status: DISCONTINUED | OUTPATIENT
Start: 2020-11-03 | End: 2020-11-08 | Stop reason: HOSPADM

## 2020-11-03 RX ORDER — SODIUM CHLORIDE, SODIUM LACTATE, POTASSIUM CHLORIDE, CALCIUM CHLORIDE 600; 310; 30; 20 MG/100ML; MG/100ML; MG/100ML; MG/100ML
INJECTION, SOLUTION INTRAVENOUS CONTINUOUS
Status: DISCONTINUED | OUTPATIENT
Start: 2020-11-03 | End: 2020-11-04

## 2020-11-03 RX ORDER — SODIUM CHLORIDE 9 MG/ML
INJECTION, SOLUTION INTRAVENOUS CONTINUOUS
Status: DISCONTINUED | OUTPATIENT
Start: 2020-11-03 | End: 2020-11-06 | Stop reason: CLARIF

## 2020-11-03 RX ORDER — DEXAMETHASONE SODIUM PHOSPHATE 4 MG/ML
6 INJECTION, SOLUTION INTRA-ARTICULAR; INTRALESIONAL; INTRAMUSCULAR; INTRAVENOUS; SOFT TISSUE EVERY 24 HOURS
Status: DISCONTINUED | OUTPATIENT
Start: 2020-11-04 | End: 2020-11-08 | Stop reason: HOSPADM

## 2020-11-03 RX ORDER — ACETAMINOPHEN 325 MG/1
975 TABLET ORAL ONCE
Status: COMPLETED | OUTPATIENT
Start: 2020-11-03 | End: 2020-11-03

## 2020-11-03 RX ORDER — IBUPROFEN 200 MG
600 TABLET ORAL EVERY 6 HOURS PRN
Status: ON HOLD | COMMUNITY
End: 2020-11-08

## 2020-11-03 RX ORDER — ALBUTEROL SULFATE 90 UG/1
2 AEROSOL, METERED RESPIRATORY (INHALATION) ONCE
Status: COMPLETED | OUTPATIENT
Start: 2020-11-03 | End: 2020-11-03

## 2020-11-03 RX ORDER — ATENOLOL 50 MG/1
50 TABLET ORAL DAILY
Status: DISCONTINUED | OUTPATIENT
Start: 2020-11-04 | End: 2020-11-05

## 2020-11-03 RX ORDER — CEFTRIAXONE SODIUM 1 G/50ML
1 INJECTION, SOLUTION INTRAVENOUS EVERY 24 HOURS
Status: DISCONTINUED | OUTPATIENT
Start: 2020-11-03 | End: 2020-11-04

## 2020-11-03 RX ORDER — ACETAMINOPHEN 500 MG
1000 TABLET ORAL EVERY 6 HOURS PRN
COMMUNITY

## 2020-11-03 RX ORDER — AZITHROMYCIN 500 MG/5ML
500 INJECTION, POWDER, LYOPHILIZED, FOR SOLUTION INTRAVENOUS EVERY 24 HOURS
Status: DISCONTINUED | OUTPATIENT
Start: 2020-11-03 | End: 2020-11-08 | Stop reason: HOSPADM

## 2020-11-03 RX ADMIN — SODIUM CHLORIDE, POTASSIUM CHLORIDE, SODIUM LACTATE AND CALCIUM CHLORIDE: 600; 310; 30; 20 INJECTION, SOLUTION INTRAVENOUS at 18:24

## 2020-11-03 RX ADMIN — SODIUM CHLORIDE: 9 INJECTION, SOLUTION INTRAVENOUS at 12:06

## 2020-11-03 RX ADMIN — SODIUM CHLORIDE: 9 INJECTION, SOLUTION INTRAVENOUS at 22:56

## 2020-11-03 RX ADMIN — CEFTRIAXONE SODIUM 1 G: 1 INJECTION, SOLUTION INTRAVENOUS at 19:55

## 2020-11-03 RX ADMIN — ACETAMINOPHEN 975 MG: 325 TABLET, FILM COATED ORAL at 13:06

## 2020-11-03 RX ADMIN — REMDESIVIR 200 MG: 100 INJECTION, POWDER, LYOPHILIZED, FOR SOLUTION INTRAVENOUS at 13:14

## 2020-11-03 RX ADMIN — ALBUTEROL SULFATE 2 PUFF: 90 AEROSOL, METERED RESPIRATORY (INHALATION) at 14:09

## 2020-11-03 RX ADMIN — DEXAMETHASONE SODIUM PHOSPHATE 6 MG: 4 INJECTION, SOLUTION INTRAMUSCULAR; INTRAVENOUS at 13:07

## 2020-11-03 RX ADMIN — ALBUTEROL SULFATE 2 PUFF: 90 INHALANT RESPIRATORY (INHALATION) at 20:15

## 2020-11-03 RX ADMIN — AZITHROMYCIN 500 MG: 500 INJECTION, POWDER, LYOPHILIZED, FOR SOLUTION INTRAVENOUS at 18:43

## 2020-11-03 RX ADMIN — ENOXAPARIN SODIUM 50 MG: 60 INJECTION SUBCUTANEOUS at 22:55

## 2020-11-03 ASSESSMENT — ENCOUNTER SYMPTOMS
ABDOMINAL PAIN: 0
MUSCULOSKELETAL NEGATIVE: 1
DIARRHEA: 1
DECREASED CONCENTRATION: 1
DIAPHORESIS: 1
SHORTNESS OF BREATH: 1
FEVER: 1
VOMITING: 0
BLOOD IN STOOL: 0
FATIGUE: 1
NAUSEA: 0
ANAL BLEEDING: 0
SORE THROAT: 0
COUGH: 1
CHILLS: 1
DYSURIA: 0
HEMATURIA: 0

## 2020-11-03 ASSESSMENT — ACTIVITIES OF DAILY LIVING (ADL): ADLS_ACUITY_SCORE: 16

## 2020-11-03 NOTE — ED PROVIDER NOTES
History     Chief Complaint   Patient presents with     Shortness of Breath     HPI  Remington Cook is a 65 year old male with known COVID-19 result from 10/28/2020 who presents with fever and increasing shortness of breath.  Arriving with oxygen saturations of 84% on room air with tachypnea and temp to 101.4.  Is having cough and malaise.  He has 2-3 diarrheal stools a day for the past few days.  He states has been sick with nonspecific symptoms for 2 to 3 weeks.  His girlfriend is also positive for Covid but she is trending better at this time.    Previous care in [place] at [facility].  Past medical history:      No previous surgeries other than a an umbilical herniorrhaphy.  And no hospitalizations/major illnesses or significant trauma/loss of consciousness or seizures.    He quit smoking 40 years ago.  He does not use alcohol.    Allergies:  No Known Allergies    Problem List:    Patient Active Problem List    Diagnosis Date Noted     Hypoxia 11/03/2020     Priority: Medium     Obesity (BMI 35.0-39.9 without comorbidity) 11/03/2020     Priority: Medium     Pneumonia due to 2019 novel coronavirus 11/03/2020     Priority: Medium        Past Medical History:    No past medical history on file.    Past Surgical History:    No past surgical history on file.    Family History:    No family history on file.    Social History:  Marital Status:  Single [1]  Social History     Tobacco Use     Smoking status: Former Smoker     Smokeless tobacco: Never Used   Substance Use Topics     Alcohol use: Not Currently     Drug use: Not Currently        Medications:         atenolol (TENORMIN) 50 MG tablet       azithromycin (ZITHROMAX) 250 MG tablet       losartan-hydrochlorothiazide (HYZAAR) 100-12.5 MG tablet          Review of Systems   Constitutional: Positive for chills, diaphoresis, fatigue and fever.   HENT: Positive for congestion. Negative for sore throat.    Respiratory: Positive for cough and  shortness of breath.    Cardiovascular: Negative for chest pain and leg swelling.   Gastrointestinal: Positive for diarrhea. Negative for abdominal pain, anal bleeding, blood in stool, nausea and vomiting.   Genitourinary: Negative.  Negative for dysuria and hematuria.   Musculoskeletal: Negative.    Psychiatric/Behavioral: Positive for decreased concentration.       Physical Exam   BP: 138/86  Pulse: 90  Temp: 101.4  F (38.6  C)  Resp: 20  Weight: 99.8 kg (220 lb)  SpO2: 90 %      Physical Exam  Vitals signs and nursing note reviewed.   Constitutional:       General: He is in acute distress.      Appearance: He is obese. He is ill-appearing. He is not toxic-appearing or diaphoretic.   HENT:      Head: Normocephalic and atraumatic.      Mouth/Throat:      Mouth: Mucous membranes are moist.      Pharynx: No pharyngeal swelling.   Eyes:      Extraocular Movements: Extraocular movements intact.      Pupils: Pupils are equal, round, and reactive to light.   Neck:      Musculoskeletal: Normal range of motion and neck supple.   Cardiovascular:      Rate and Rhythm: Normal rate and regular rhythm.      Heart sounds: Normal heart sounds.   Pulmonary:      Effort: Pulmonary effort is normal. Tachypnea present.      Breath sounds: Examination of the right-middle field reveals rhonchi. Examination of the left-middle field reveals rhonchi. Examination of the right-lower field reveals rhonchi. Examination of the left-lower field reveals rhonchi. Rhonchi present.   Chest:      Chest wall: No tenderness.   Abdominal:      General: Bowel sounds are normal.      Palpations: Abdomen is soft.      Tenderness: There is no abdominal tenderness.   Musculoskeletal: Normal range of motion.      Right lower leg: Edema (Trace pitting edema.) present.      Left lower leg: Edema (Trace pitting edema) present.   Lymphadenopathy:      Cervical: No cervical adenopathy.   Skin:     General: Skin is warm and dry.      Capillary Refill: Capillary  refill takes less than 2 seconds.   Neurological:      General: No focal deficit present.      Mental Status: He is alert.   Psychiatric:      Comments: Distant affect         ED Course        Procedures          EKG: Normal sinus rhythm at 86 bpm with nonspecific ST and T wave abnormalities but otherwise no obvious ischemia    Critical Care time:  none               Results for orders placed or performed during the hospital encounter of 11/03/20 (from the past 24 hour(s))   CBC with platelets differential   Result Value Ref Range    WBC 11.0 4.0 - 11.0 10e9/L    RBC Count 4.57 4.4 - 5.9 10e12/L    Hemoglobin 14.7 13.3 - 17.7 g/dL    Hematocrit 43.4 40.0 - 53.0 %    MCV 95 78 - 100 fl    MCH 32.2 26.5 - 33.0 pg    MCHC 33.9 31.5 - 36.5 g/dL    RDW 12.5 10.0 - 15.0 %    Platelet Count 268 150 - 450 10e9/L    Diff Method Automated Method     % Neutrophils 81.1 %    % Lymphocytes 13.3 %    % Monocytes 4.7 %    % Eosinophils 0.1 %    % Basophils 0.2 %    % Immature Granulocytes 0.6 %    Absolute Neutrophil 8.9 (H) 1.6 - 8.3 10e9/L    Absolute Lymphocytes 1.5 0.8 - 5.3 10e9/L    Absolute Monocytes 0.5 0.0 - 1.3 10e9/L    Absolute Eosinophils 0.0 0.0 - 0.7 10e9/L    Absolute Basophils 0.0 0.0 - 0.2 10e9/L    Abs Immature Granulocytes 0.1 0 - 0.4 10e9/L   INR   Result Value Ref Range    INR 1.01 0.86 - 1.14   D dimer quantitative   Result Value Ref Range    D Dimer 0.5 0.0 - 0.50 ug/ml FEU   Partial thromboplastin time   Result Value Ref Range    PTT 31 22 - 37 sec   Comprehensive metabolic panel   Result Value Ref Range    Sodium 137 134 - 144 mmol/L    Potassium 3.3 (L) 3.5 - 5.1 mmol/L    Chloride 100 98 - 107 mmol/L    Carbon Dioxide 27 21 - 31 mmol/L    Anion Gap 10 3 - 14 mmol/L    Glucose 105 70 - 105 mg/dL    Urea Nitrogen 15 7 - 25 mg/dL    Creatinine 0.95 0.70 - 1.30 mg/dL    GFR Estimate 79 >60 mL/min/[1.73_m2]    GFR Estimate If Black >90 >60 mL/min/[1.73_m2]    Calcium 8.8 8.6 - 10.3 mg/dL    Bilirubin Total  0.6 0.3 - 1.0 mg/dL    Albumin 3.5 3.5 - 5.7 g/dL    Protein Total 7.3 6.4 - 8.9 g/dL    Alkaline Phosphatase 53 34 - 104 U/L    ALT 35 7 - 52 U/L    AST 49 (H) 13 - 39 U/L   Lactic acid whole blood   Result Value Ref Range    Lactic Acid 1.9 0.7 - 2.0 mmol/L   Troponin GH   Result Value Ref Range    Troponin 9.8 <34.0 pg/mL   Nt probnp inpatient (BNP)   Result Value Ref Range    N-Terminal Pro BNP Inpatient 62 0 - 100 pg/mL   CRP inflammation   Result Value Ref Range    CRP Inflammation 63.2 (H) <10.0 mg/L   CK total   Result Value Ref Range    CK Total 279 (H) 30 - 223 U/L   Ferritin   Result Value Ref Range    Ferritin 1,340 (H) 23.9 - 336.2 ng/mL   Blood gas venous and oxyhgb   Result Value Ref Range    Ph Venous 7.46 (H) 7.32 - 7.43 pH    PCO2 Venous 40 40 - 50 mm Hg    PO2 Venous 28 25 - 47 mm Hg    Bicarbonate Venous 28 21 - 28 mmol/L    FIO2 0     Oxyhemoglobin Venous 52 %   XR Chest Port 1 View    Narrative    PROCEDURE: XR CHEST PORT 1 VW 11/3/2020 12:44 PM    HISTORY: Covid19 + now with oxygen need.    COMPARISONS: 11/1/2020.    TECHNIQUE: Single view.    FINDINGS: Heart is mildly enlarged but is stable. Mild patchy  infiltrates at both lung bases have not changed significantly. No  large confluent infiltrate is seen and there is no pleural effusion.         Impression    IMPRESSION: Bilateral infiltrates consistent with pneumonia, not  significantly changed.    CARLITA BOYER MD   UA reflex to Microscopic and Culture    Specimen: Urine clean catch; Midstream Urine   Result Value Ref Range    Color Urine Light Yellow     Appearance Urine Clear     Glucose Urine Negative NEG^Negative mg/dL    Bilirubin Urine Negative NEG^Negative    Ketones Urine Negative NEG^Negative mg/dL    Specific Gravity Urine 1.014 1.003 - 1.035    Blood Urine Small (A) NEG^Negative    pH Urine 6.5 5.0 - 7.0 pH    Protein Albumin Urine 10 (A) NEG^Negative mg/dL    Urobilinogen mg/dL Normal 0.0 - 2.0 mg/dL    Nitrite Urine  "Negative NEG^Negative    Leukocyte Esterase Urine Negative NEG^Negative    Source Midstream Urine     RBC Urine 4 (H) 0 - 2 /HPF    WBC Urine 1 0 - 5 /HPF    Mucous Urine Present (A) NEG^Negative /LPF    Hyaline Casts 1 0 - 2 /LPF       Medications   sodium chloride 0.9% infusion ( Intravenous New Bag 11/3/20 1206)   remdesivir 200 mg in sodium chloride 0.9 % 250 mL intermittent infusion (200 mg Intravenous New Bag 11/3/20 1314)   acetaminophen (TYLENOL) tablet 975 mg (975 mg Oral Given 11/3/20 1306)   dexamethasone (DECADRON) injection 6 mg (6 mg Intravenous Given 11/3/20 1307)   albuterol (PROAIR HFA/PROVENTIL HFA/VENTOLIN HFA) 108 (90 Base) MCG/ACT inhaler 2 puff (2 puffs Inhalation Given 11/3/20 1409)     1:22 PM I discussed patient with Dr. Cortez, hospitalist accepting patient on admission.  I have started remdesivir and given another dose of 6 mg Decadron IV.  He was given Tylenol for his fever and will use a albuterol inhaler.  Estimated body mass index is 35.51 kg/m  as calculated from the following:    Height as of 11/1/20: 1.676 m (5' 6\").    Weight as of this encounter: 99.8 kg (220 lb).  Assessments & Plan (with Medical Decision Making)   65-year-old male with several weeks of malaise and uncertain illness but more significant respiratory symptoms over the past week with positive Covid testing from 10/28/2020 and now much more short of breath with persistent fever.  He arrives with hypoxia to 84% on room air improving with O2 by NC and will need hospitalization.  He is started on remdesivir and Decadron.  He has already been started on azithromycin and will continue this. His d-dimer is negative at this time and will hold CT scan.     I have reviewed the nursing notes.    I have reviewed the findings, diagnosis, plan and need for follow up with the patient.       New Prescriptions    No medications on file       Final diagnoses:   Pneumonia due to 2019 novel coronavirus   Hypoxia   Obesity (BMI " 35.0-39.9 without comorbidity)       11/3/2020   [facility]     Porfirio Sewell MD  11/03/20 9002       Porfirio Sewell MD  11/03/20 2662

## 2020-11-03 NOTE — ED TRIAGE NOTES
Patient presents to the ED with complaints of increased SOB.  Patient states he was covid positive approximately 2 weeks, patient states today he was supposed to be able to come off of quarantine today, however, he has been experiencing increasing SOB.  Patient states he was seen in the ED Sunday and diagnosed with pneumonia.  Patient does not wear home 02, on arrival 02 sats were 84% on RA.  Patient placed on 4L 02 per NC in order to obtain sats of 91%.

## 2020-11-03 NOTE — ED NOTES
Small amount of IV antiviral med infiltrated pump reads 1 ml infused, redness at site, pt complained of burning. Stopped med and removed IV. Pt states burning is pretty much gone.

## 2020-11-03 NOTE — ED NOTES
With minimal movement in bed pt desats to 86% on 4 L per N/C. Pt increased to 5L per N/C now at 91%

## 2020-11-03 NOTE — PROGRESS NOTES
Admission Note    Data:  Remington Cook admitted to ICU room 918 from emergency room at 1510.      Action:  Dr. Cortez has been notified of admission. Pt oriented to unit, call light in reach.     Response:  Patient AAOx4, tolerated transfer well.    Brandon Schulz RN on 11/3/2020 at 5:43 PM

## 2020-11-04 LAB
ANION GAP SERPL CALCULATED.3IONS-SCNC: 8 MMOL/L (ref 3–14)
BASOPHILS # BLD AUTO: 0 10E9/L (ref 0–0.2)
BASOPHILS NFR BLD AUTO: 0.1 %
BUN SERPL-MCNC: 18 MG/DL (ref 7–25)
CALCIUM SERPL-MCNC: 8.2 MG/DL (ref 8.6–10.3)
CHLORIDE SERPL-SCNC: 104 MMOL/L (ref 98–107)
CO2 SERPL-SCNC: 27 MMOL/L (ref 21–31)
CREAT SERPL-MCNC: 0.72 MG/DL (ref 0.7–1.3)
CRP SERPL-MCNC: 90 MG/L
D DIMER PPP FEU-MCNC: <0.3 UG/ML FEU (ref 0–0.5)
DIFFERENTIAL METHOD BLD: ABNORMAL
EOSINOPHIL # BLD AUTO: 0 10E9/L (ref 0–0.7)
EOSINOPHIL NFR BLD AUTO: 0 %
ERYTHROCYTE [DISTWIDTH] IN BLOOD BY AUTOMATED COUNT: 12.4 % (ref 10–15)
FIBRINOGEN PPP-MCNC: 641 MG/DL (ref 200–420)
GFR SERPL CREATININE-BSD FRML MDRD: >90 ML/MIN/{1.73_M2}
GLUCOSE SERPL-MCNC: 153 MG/DL (ref 70–105)
HCT VFR BLD AUTO: 40.3 % (ref 40–53)
HGB BLD-MCNC: 13.5 G/DL (ref 13.3–17.7)
IMM GRANULOCYTES # BLD: 0.1 10E9/L (ref 0–0.4)
IMM GRANULOCYTES NFR BLD: 0.8 %
INR PPP: 1.09 (ref 0.86–1.14)
LDH SERPL L TO P-CCNC: 355 U/L (ref 140–271)
LYMPHOCYTES # BLD AUTO: 1.4 10E9/L (ref 0.8–5.3)
LYMPHOCYTES NFR BLD AUTO: 18.4 %
MCH RBC QN AUTO: 32 PG (ref 26.5–33)
MCHC RBC AUTO-ENTMCNC: 33.5 G/DL (ref 31.5–36.5)
MCV RBC AUTO: 96 FL (ref 78–100)
MONOCYTES # BLD AUTO: 0.3 10E9/L (ref 0–1.3)
MONOCYTES NFR BLD AUTO: 4.2 %
NEUTROPHILS # BLD AUTO: 5.6 10E9/L (ref 1.6–8.3)
NEUTROPHILS NFR BLD AUTO: 76.5 %
PLATELET # BLD AUTO: 243 10E9/L (ref 150–450)
POTASSIUM SERPL-SCNC: 3.1 MMOL/L (ref 3.5–5.1)
POTASSIUM SERPL-SCNC: 3.8 MMOL/L (ref 3.5–5.1)
PROCALCITONIN SERPL-MCNC: 0.52 NG/ML
RBC # BLD AUTO: 4.22 10E12/L (ref 4.4–5.9)
RETICS # AUTO: 31.2 10E9/L (ref 25–95)
RETICS/RBC NFR AUTO: 0.7 % (ref 0.5–2)
SODIUM SERPL-SCNC: 139 MMOL/L (ref 134–144)
TROPONIN I SERPL-MCNC: 8.1 PG/ML
WBC # BLD AUTO: 7.4 10E9/L (ref 4–11)

## 2020-11-04 PROCEDURE — 999N000157 HC STATISTIC RCP TIME EA 10 MIN

## 2020-11-04 PROCEDURE — 80048 BASIC METABOLIC PNL TOTAL CA: CPT | Performed by: FAMILY MEDICINE

## 2020-11-04 PROCEDURE — 84132 ASSAY OF SERUM POTASSIUM: CPT | Performed by: FAMILY MEDICINE

## 2020-11-04 PROCEDURE — 99232 SBSQ HOSP IP/OBS MODERATE 35: CPT | Performed by: FAMILY MEDICINE

## 2020-11-04 PROCEDURE — 86140 C-REACTIVE PROTEIN: CPT | Performed by: FAMILY MEDICINE

## 2020-11-04 PROCEDURE — 250N000013 HC RX MED GY IP 250 OP 250 PS 637: Performed by: FAMILY MEDICINE

## 2020-11-04 PROCEDURE — 85045 AUTOMATED RETICULOCYTE COUNT: CPT | Performed by: FAMILY MEDICINE

## 2020-11-04 PROCEDURE — 200N000001 HC R&B ICU

## 2020-11-04 PROCEDURE — 85384 FIBRINOGEN ACTIVITY: CPT | Performed by: FAMILY MEDICINE

## 2020-11-04 PROCEDURE — 250N000009 HC RX 250: Performed by: FAMILY MEDICINE

## 2020-11-04 PROCEDURE — 85610 PROTHROMBIN TIME: CPT | Performed by: FAMILY MEDICINE

## 2020-11-04 PROCEDURE — 85025 COMPLETE CBC W/AUTO DIFF WBC: CPT | Performed by: FAMILY MEDICINE

## 2020-11-04 PROCEDURE — 84484 ASSAY OF TROPONIN QUANT: CPT | Performed by: FAMILY MEDICINE

## 2020-11-04 PROCEDURE — 85379 FIBRIN DEGRADATION QUANT: CPT | Performed by: FAMILY MEDICINE

## 2020-11-04 PROCEDURE — 250N000011 HC RX IP 250 OP 636: Performed by: FAMILY MEDICINE

## 2020-11-04 PROCEDURE — 36415 COLL VENOUS BLD VENIPUNCTURE: CPT | Performed by: FAMILY MEDICINE

## 2020-11-04 PROCEDURE — 84145 PROCALCITONIN (PCT): CPT | Performed by: FAMILY MEDICINE

## 2020-11-04 PROCEDURE — 258N000003 HC RX IP 258 OP 636: Performed by: FAMILY MEDICINE

## 2020-11-04 PROCEDURE — 83615 LACTATE (LD) (LDH) ENZYME: CPT | Performed by: FAMILY MEDICINE

## 2020-11-04 RX ORDER — POTASSIUM CHLORIDE 1500 MG/1
40 TABLET, EXTENDED RELEASE ORAL ONCE
Status: COMPLETED | OUTPATIENT
Start: 2020-11-04 | End: 2020-11-04

## 2020-11-04 RX ORDER — CEFTRIAXONE SODIUM 2 G/50ML
2 INJECTION, SOLUTION INTRAVENOUS EVERY 24 HOURS
Status: DISCONTINUED | OUTPATIENT
Start: 2020-11-04 | End: 2020-11-08 | Stop reason: HOSPADM

## 2020-11-04 RX ORDER — LANOLIN ALCOHOL/MO/W.PET/CERES
6 CREAM (GRAM) TOPICAL
Status: DISCONTINUED | OUTPATIENT
Start: 2020-11-04 | End: 2020-11-08 | Stop reason: HOSPADM

## 2020-11-04 RX ADMIN — ENOXAPARIN SODIUM 50 MG: 60 INJECTION SUBCUTANEOUS at 21:29

## 2020-11-04 RX ADMIN — DEXAMETHASONE SODIUM PHOSPHATE 6 MG: 4 INJECTION, SOLUTION INTRAMUSCULAR; INTRAVENOUS at 09:55

## 2020-11-04 RX ADMIN — ATENOLOL 50 MG: 50 TABLET ORAL at 09:58

## 2020-11-04 RX ADMIN — ENOXAPARIN SODIUM 50 MG: 60 INJECTION SUBCUTANEOUS at 10:00

## 2020-11-04 RX ADMIN — CEFTRIAXONE SODIUM 2 G: 2 INJECTION, SOLUTION INTRAVENOUS at 12:29

## 2020-11-04 RX ADMIN — AZITHROMYCIN 500 MG: 500 INJECTION, POWDER, LYOPHILIZED, FOR SOLUTION INTRAVENOUS at 17:39

## 2020-11-04 RX ADMIN — MELATONIN TAB 3 MG 6 MG: 3 TAB at 22:10

## 2020-11-04 RX ADMIN — POTASSIUM CHLORIDE 40 MEQ: 1500 TABLET, EXTENDED RELEASE ORAL at 16:24

## 2020-11-04 RX ADMIN — REMDESIVIR 100 MG: 100 INJECTION, POWDER, LYOPHILIZED, FOR SOLUTION INTRAVENOUS at 10:02

## 2020-11-04 RX ADMIN — LOSARTAN POTASSIUM: 50 TABLET, FILM COATED ORAL at 09:59

## 2020-11-04 ASSESSMENT — ACTIVITIES OF DAILY LIVING (ADL)
ADLS_ACUITY_SCORE: 17
ADLS_ACUITY_SCORE: 18

## 2020-11-04 ASSESSMENT — MIFFLIN-ST. JEOR: SCORE: 1708.88

## 2020-11-04 NOTE — H&P
[facility]    History and Physical - Hospitalist Service       Date of Admission:  11/3/2020    Assessment & Plan   Remington Cook is a 65 year old male admitted on 11/3/2020. He was diagnosed with COVID-19 on October 28, 2020.  He presented to the emergency department with increasing shortness of breath, fever and diarrheal stools.    Patient was acutely hypoxic with saturations 84% on room air and febrile.  He required 5 L of oxygen via nasal cannula to keep sats greater than 90%.  He will be admitted to the ICU treated with remdesivir, steroids and anticoagulated with Lovenox at 0.5 mg/kg.      Patient has evidence of bilateral pneumonia which is more likely viral but we will continue his azithromycin and add Rocephin.  If fevers persist or procalcitonin elevated will consider transition to Zosyn/vancomycin.    Principal Problem:    Pneumonia due to 2019 novel coronavirus    Assessment: Patient received 1 dose of Rocephin 6 days ago, has been on a azithromycin.  I suspect symptoms are more likely due to ongoing infection with Covid.    Plan: Continue azithromycin, restart Rocephin.  Check procalcitonin.  If worsening will broaden antibiotics.  Active Problems:    Hypoxia    Assessment: Likely due to active COVID-19 infection.  Currently stable but tenuous on 5 L via nasal cannula.  He reports feeling much better.    Plan: Continue dexamethasone 6 mg daily.  Albuterol inhaler if needed.  Currently no wheezing however.  Continue remdesivir.  Based on laboratory studies anticoagulation with Lovenox 0.5 mg/kg twice daily appropriate.    Obesity (BMI 35.0-39.9 without comorbidity)    Assessment: Independent risk factor.    Plan: Suggest long-term caloric reduction and increased exercise.  Hypertension   Assessment: Stable  Plan: Continue losartan/hydrochlorthiazide.       Diet: Regular Diet Adult    DVT Prophylaxis: Lovenox  Llanos Catheter: not present  Code Status:  Full code         Disposition  Plan   Expected discharge: 2 - 3 days, recommended to prior living arrangement once antibiotic plan established, O2 use less than 1 liters/minute and SIRS/Sepsis treated.  Entered: Braeden Cortez MD 11/03/2020, 9:54 PM     The patient's care was discussed with the Patient.    Braeden Cortez MD  [facility]  Contact information available via [facility] Paging/Directory      ______________________________________________________________________    Chief Complaint   Shortness of breath, fever    History is obtained from the patient    History of Present Illness   Remington Cook is a 65 year old male who reports having had issues with cough, shortness of breath and malaise for a month.  About a week ago his shortness of breath worsened and he was tested for COVID-19.  That test was performed on October 28 and returned positive.  He was evaluated in our emergency department 4 days later due to worsening difficulty breathing.  At that point he was eating and drinking well and had not had any fevers.  Was given a dose of Rocephin in the ED and sent home on azithromycin.      Unfortunately, symptoms continued to worsen.  He reports this morning he became short of breath and could not catch his breath.  Cough is still largely unproductive.  He has had fever and increasing malaise for the past 2 days.  Also has had loose stools but not large volume of output.  Oral intake has been less due to illness.    Currently he reports feeling much better than he did on initial presentation.  Reports his breathing is much easier.  Denies any chest pain.  Cough seems to have improved as well.    Review of Systems    CONSTITUTIONAL: See HPI  INTEGUMENTARY/SKIN: NEGATIVE for worrisome rashes, moles or lesions  EYES: NEGATIVE for vision changes or irritation  ENT/MOUTH: NEGATIVE for ear, mouth and throat problems  RESP: See HPI  CV: NEGATIVE for chest pain, palpitations or peripheral edema  GI: NEGATIVE for nausea,  abdominal pain, heartburn, or change in bowel habits  : NEGATIVE for frequency, dysuria, or hematuria  MUSCULOSKELETAL: See HPI  NEURO: NEGATIVE for transient or persistent neurologic deficits  ENDOCRINE: NEGATIVE for temperature intolerance, skin/hair changes  HEME: NEGATIVE for bleeding problems  PSYCHIATRIC: NEGATIVE for changes in mood or affect    Past Medical History    HTN    Past Surgical History    Hernia Repair  Never had colonoscopy    Social History   I have reviewed this patient's social history and updated it with pertinent information if needed.  Social History     Tobacco Use     Smoking status: Former Smoker     Smokeless tobacco: Never Used   Substance Use Topics     Alcohol use: Not Currently     Drug use: Not Currently       Family History     Heart attack Father     Coronary artery disease Father     Anorexia Sister     Heart attack Brother     No Known Problems Daughter     Multiple sclerosis Son           Prior to Admission Medications   Prior to Admission Medications   Prescriptions Last Dose Informant Patient Reported? Taking?   acetaminophen (TYLENOL) 500 MG tablet 11/2/2020 at Unknown time  Yes Yes   Sig: Take 1,000 mg by mouth every 6 hours as needed for mild pain   atenolol (TENORMIN) 50 MG tablet 11/3/2020 at 0900  Yes Yes   Sig: Take 50 mg by mouth daily   azithromycin (ZITHROMAX) 250 MG tablet 11/3/2020 at 0900  No Yes   Sig: Take 1 tablet (250 mg) by mouth daily Take 1 tablet per day. 250mg/day starting on 11/2, take for 4 days. You will have 2 left over pills.   ibuprofen (ADVIL/MOTRIN) 200 MG tablet 11/2/2020 at Unknown time  Yes Yes   Sig: Take 600 mg by mouth every 6 hours as needed for mild pain   losartan-hydrochlorothiazide (HYZAAR) 100-12.5 MG tablet 11/3/2020 at 0900  Yes Yes   Sig: Take 1 tablet by mouth daily      Facility-Administered Medications: None     Allergies   No Known Allergies    Physical Exam   Vital Signs: Temp: 98.9  F (37.2  C) Temp src: Temporal BP:  125/82 Pulse: 63   Resp: 11 SpO2: 90 % O2 Device: High Flow Nasal Cannula (HFNC) Oxygen Delivery: 7 LPM  Weight: 220 lbs 0 oz    Constitutional: awake, alert, cooperative, currently no respiratory distress.  Eyes: Lids and lashes normal, pupils equal, round and reactive to light, extra ocular muscles intact, sclera clear, conjunctiva normal  ENT: Normocephalic, without obvious abnormality, atraumatic, sinuses nontender on palpation, external ears without lesions, oral pharynx with moist mucous membranes.  Hematologic / Lymphatic: No lymphadenopathy  Respiratory: Lungs are actually clear to auscultation bilaterally.  I do not appreciate any rhonchi or rales.  Cardiovascular: Regular rate and rhythm.  No murmurs rubs or gallops.  No lower extremity edema.  GI: Abdomen is soft, nontender.  Genitounirinary: No suprapubic or CVA tenderness.  Skin: no bruising or bleeding and no redness, warmth, or swelling  Musculoskeletal: No synovitis.  Muscle strength age and body habitus appropriateas well as equal and even.   Neurologic: Awake, alert, oriented to name, place and time.  Cranial nerves intact.  Strength and sensation normal.  Cerebellar function intact.  Reflexes symmetric.  No deficits.  Neuropsychiatric: General: normal, calm and normal eye contact  Orientation: oriented to self, place, time and situation  Memory and insight: memory for past and recent events intact and thought process normal    Data   Data reviewed today: I reviewed all medications, new labs and imaging results over the last 24 hours. I personally reviewed the chest x-ray image(s) showing Bilateral infiltrates most consistent with viral pneumonia..    Recent Labs   Lab 11/03/20  1206 11/01/20  1247   WBC 11.0 6.9   HGB 14.7 14.7   MCV 95 95    182   INR 1.01 1.03    135   POTASSIUM 3.3* 3.3*   CHLORIDE 100 100   CO2 27 27   BUN 15 13   CR 0.95 0.90   ANIONGAP 10 8   OSWALDO 8.8 8.2*    108*   ALBUMIN 3.5  --    PROTTOTAL 7.3  --     BILITOTAL 0.6  --    ALKPHOS 53  --    ALT 35  --    AST 49*  --      Recent Results (from the past 24 hour(s))   XR Chest Port 1 View    Narrative    PROCEDURE: XR CHEST PORT 1 VW 11/3/2020 12:44 PM    HISTORY: Covid19 + now with oxygen need.    COMPARISONS: 11/1/2020.    TECHNIQUE: Single view.    FINDINGS: Heart is mildly enlarged but is stable. Mild patchy  infiltrates at both lung bases have not changed significantly. No  large confluent infiltrate is seen and there is no pleural effusion.         Impression    IMPRESSION: Bilateral infiltrates consistent with pneumonia, not  significantly changed.    CARLITA BOYER MD

## 2020-11-04 NOTE — PROGRESS NOTES
Resting in bed.  Sat up for breakfast.  States feeling much better this am. Lungs course in the RUL otherwise clear.  SOB with act.  Discussed course of care.  No questions at this time.  Eating breakfast.  Cont to monitor.

## 2020-11-04 NOTE — PHARMACY-ADMISSION MEDICATION HISTORY
Pharmacy -- Admission Medication Reconciliation    Prior to admission (PTA) medications were reviewed and the patient's PTA medication list was updated.    Sources Consulted: [employer], patient on telephone    The reliability of this Medication Reconciliation is: Reliability: Reliable    The following significant changes were made:  Added last doses  Added [facility]  Added [facility] for this admission  Added acetaminophen  Added ibuprofen    In addition, the patient's allergies were reviewed with the patient and left as follows:   Allergies: Patient has no known allergies.    The pharmacist has reviewed with the patient that all personal medications should be removed from the building or locked in the belongings safe.  Patient shall only take medications ordered by the physician and administered by the nursing staff.       Medication barriers identified: none noted   Medication adherence concerns: none noted   Understanding of emergency medications: n/a    Manda Blanco [facility], 11/3/2020,  6:29 PM

## 2020-11-04 NOTE — PROGRESS NOTES
Up to BR with 15 L HFNC then resettled.  SOB with act but tolerated better than yesterday per pt.  Resp rates in the 20's baseline.  30's while up.  Takes awhile to recover.  Denies pain.

## 2020-11-04 NOTE — PLAN OF CARE
"Has had increased shortness of breath and difficulty \"catching\" his breath, SpO2 down to 74% while up to bathroom on 10 lpm, LS crackles to bilateral bases, does have a productive and congested cough, elevated head of bed, Dr. Cortez has been updated, RT has been in to evaluate as well.     Adri Blanchard RN on 11/3/2020 at 11:25 PM    "

## 2020-11-04 NOTE — PROGRESS NOTES
Vapotherm ordered and has been set-up by Dr. Cortez/EDUARDO Avila, RRT.  Vapotherm ID number is [id number].  Initial settings are FiO2 75, Temp 33 degress C, 25 lpm Flow.  Humidity has been set-up and unit has standard Circuit in place.  Cannula size chosen was Adult. Device was set-up without Aerogen unit.  RT will continue to follow and check on pt and device Q2.

## 2020-11-04 NOTE — PROGRESS NOTES
"Reports feeling \"little better\", decreased respiratory effort, SpO2 ranges from 89-94% on vapotherm, resting quietly in bed at this time.    Adri Blanchard RN on 11/4/2020 at 6:24 AM    "

## 2020-11-04 NOTE — PROGRESS NOTES
[facility]    Medicine Progress Note - Hospitalist Service       Date of Admission:  11/3/2020  Assessment & Plan       Remington Cook is a 65 year old male admitted on 11/3/2020. He was diagnosed with COVID-19 on October 28, 2020.  He presented to the emergency department with increasing shortness of breath, fever and diarrheal stools.    Patient was acutely hypoxic with saturations 84% on room air and febrile.  He required 5 L of oxygen via nasal cannula to keep sats greater than 90%.  He was admitted to the ICU treated with remdesivir, steroids and anticoagulated with Lovenox at 0.5 mg/kg.      Patient has evidence of bilateral pneumonia which is more likely viral but we continued azithromycin and added Rocephin.  He has not had fevers since admission and his procalcitonin was reassuring.    Principal Problem:    Pneumonia due to 2019 novel coronavirus    Assessment: Patient received 1 dose of Rocephin 6 days prior to admission, has been on a azithromycin.  I suspect symptoms are more likely due to ongoing infection with Covid.    Plan: Continue azithromycin, Rocephin.  I suspect this is predominantly viral pneumonia from Covid.  .  Active Problems:    Hypoxia    Assessment: Likely due to active COVID-19 infection.  He feels much better today and has been stable on Vapotherm    Plan: Continue dexamethasone 6 mg daily.  Albuterol inhaler if needed.  Lungs clear.  Continue remdesivir.  Based on laboratory studies anticoagulation with Lovenox 0.5 mg/kg twice daily appropriate.    Obesity (BMI 35.0-39.9 without comorbidity)    Assessment: Independent risk factor.    Plan: Suggest long-term caloric reduction and increased exercise.  Hypertension   Assessment: Stable  Plan: Continue losartan/hydrochlorthiazide.  Hypokalemia  Assessment: Moderate  Plan: Potassium replacement protocol       Diet: Regular Diet Adult    DVT Prophylaxis: Enoxaparin (Lovenox) SQ  Llanos Catheter: not present  Code Status:  Full Code           Disposition Plan   Expected discharge: 2 - 3 days, recommended to prior living arrangement once O2 use less than 2 liters/minute.  Entered: Braeden Cortez MD 11/04/2020, 5:45 PM       The patient's care was discussed with the Patient.    Braeden Cortez MD  Hospitalist Service  [facility]  Contact information available via [facility] Paging/Directory    ______________________________________________________________________    Interval History   Patient did have quite a bit of shortness of breath overnight extending into the morning.  Shortness of breath was present at rest and with activity.  Now he does feel a bit better.  He was able to switch to nasal cannula to ambulate to the bathroom but did require time to recover afterwards.  He reports his cough seems to be less prominent.  Did not have any fevers after initial fever on admission.  He is eating and drinking well.  No new issues.    Data reviewed today: I reviewed all medications, new labs and imaging results over the last 24 hours. I personally reviewed no images or EKG's today.    Physical Exam   Vital Signs: Temp: 97.7  F (36.5  C) Temp src: Temporal BP: (!) 144/98 Pulse: 61   Resp: 13 SpO2: 90 % O2 Device: High Flow Nasal Cannula (HFNC) Oxygen Delivery: 25 LPM  Weight: 220 lbs 14.41 oz  Constitutional: awake, alert, cooperative, no apparent distress, and appears stated age  Respiratory: Clear to auscultation bilaterally.  No wheezing.  Cardiovascular: Regular rate and rhythm.  No lower extremity edema.  GI: Abdomen soft, nontender  Musculoskeletal: No synovitis.  Muscle strength age and body habitus appropriateas well as equal and even.   Neuropsychiatric: General: normal, calm and normal eye contact  Orientation: oriented to self, place, time and situation  Memory and insight: memory for past and recent events intact and thought process normal    Data   Recent Labs   Lab 11/04/20  0550 11/03/20  1206  11/01/20  1247   WBC 7.4 11.0 6.9   HGB 13.5 14.7 14.7   MCV 96 95 95    268 182   INR 1.09 1.01 1.03    137 135   POTASSIUM 3.1* 3.3* 3.3*   CHLORIDE 104 100 100   CO2 27 27 27   BUN 18 15 13   CR 0.72 0.95 0.90   ANIONGAP 8 10 8   OSWALDO 8.2* 8.8 8.2*   * 105 108*   ALBUMIN  --  3.5  --    PROTTOTAL  --  7.3  --    BILITOTAL  --  0.6  --    ALKPHOS  --  53  --    ALT  --  35  --    AST  --  49*  --      No results found for this or any previous visit (from the past 24 hour(s)).

## 2020-11-05 LAB
ANION GAP SERPL CALCULATED.3IONS-SCNC: 8 MMOL/L (ref 3–14)
AT III ACT/NOR PPP CHRO: 113 % (ref 85–135)
BASOPHILS # BLD AUTO: 0 10E9/L (ref 0–0.2)
BASOPHILS NFR BLD AUTO: 0.2 %
BUN SERPL-MCNC: 21 MG/DL (ref 7–25)
CALCIUM SERPL-MCNC: 8.7 MG/DL (ref 8.6–10.3)
CHLORIDE SERPL-SCNC: 106 MMOL/L (ref 98–107)
CO2 SERPL-SCNC: 28 MMOL/L (ref 21–31)
CREAT SERPL-MCNC: 0.73 MG/DL (ref 0.7–1.3)
CRP SERPL-MCNC: 32 MG/L
D DIMER PPP FEU-MCNC: <0.3 UG/ML FEU (ref 0–0.5)
DIFFERENTIAL METHOD BLD: ABNORMAL
EOSINOPHIL # BLD AUTO: 0 10E9/L (ref 0–0.7)
EOSINOPHIL NFR BLD AUTO: 0 %
ERYTHROCYTE [DISTWIDTH] IN BLOOD BY AUTOMATED COUNT: 12.4 % (ref 10–15)
FIBRINOGEN PPP-MCNC: 572 MG/DL (ref 200–420)
GFR SERPL CREATININE-BSD FRML MDRD: >90 ML/MIN/{1.73_M2}
GLUCOSE SERPL-MCNC: 122 MG/DL (ref 70–105)
HCT VFR BLD AUTO: 41.7 % (ref 40–53)
HGB BLD-MCNC: 14.1 G/DL (ref 13.3–17.7)
IL6 SERPL-MCNC: 18.61 PG/ML
IMM GRANULOCYTES # BLD: 0.1 10E9/L (ref 0–0.4)
IMM GRANULOCYTES NFR BLD: 0.9 %
INR PPP: 1.12 (ref 0.86–1.14)
LDH SERPL L TO P-CCNC: 335 U/L (ref 140–271)
LYMPHOCYTES # BLD AUTO: 1.8 10E9/L (ref 0.8–5.3)
LYMPHOCYTES NFR BLD AUTO: 19.8 %
MCH RBC QN AUTO: 32.9 PG (ref 26.5–33)
MCHC RBC AUTO-ENTMCNC: 33.8 G/DL (ref 31.5–36.5)
MCV RBC AUTO: 97 FL (ref 78–100)
MONOCYTES # BLD AUTO: 0.6 10E9/L (ref 0–1.3)
MONOCYTES NFR BLD AUTO: 7 %
NEUTROPHILS # BLD AUTO: 6.5 10E9/L (ref 1.6–8.3)
NEUTROPHILS NFR BLD AUTO: 72.1 %
PLATELET # BLD AUTO: 294 10E9/L (ref 150–450)
POTASSIUM SERPL-SCNC: 3.9 MMOL/L (ref 3.5–5.1)
RBC # BLD AUTO: 4.29 10E12/L (ref 4.4–5.9)
RETICS # AUTO: 28.7 10E9/L (ref 25–95)
RETICS/RBC NFR AUTO: 0.7 % (ref 0.5–2)
SODIUM SERPL-SCNC: 142 MMOL/L (ref 134–144)
WBC # BLD AUTO: 9 10E9/L (ref 4–11)

## 2020-11-05 PROCEDURE — 999N000157 HC STATISTIC RCP TIME EA 10 MIN

## 2020-11-05 PROCEDURE — 85610 PROTHROMBIN TIME: CPT | Performed by: FAMILY MEDICINE

## 2020-11-05 PROCEDURE — 36415 COLL VENOUS BLD VENIPUNCTURE: CPT | Performed by: FAMILY MEDICINE

## 2020-11-05 PROCEDURE — 85379 FIBRIN DEGRADATION QUANT: CPT | Performed by: FAMILY MEDICINE

## 2020-11-05 PROCEDURE — 258N000003 HC RX IP 258 OP 636: Performed by: FAMILY MEDICINE

## 2020-11-05 PROCEDURE — 85384 FIBRINOGEN ACTIVITY: CPT | Performed by: FAMILY MEDICINE

## 2020-11-05 PROCEDURE — 83615 LACTATE (LD) (LDH) ENZYME: CPT | Performed by: FAMILY MEDICINE

## 2020-11-05 PROCEDURE — 86140 C-REACTIVE PROTEIN: CPT | Performed by: FAMILY MEDICINE

## 2020-11-05 PROCEDURE — 250N000013 HC RX MED GY IP 250 OP 250 PS 637: Performed by: FAMILY MEDICINE

## 2020-11-05 PROCEDURE — 250N000011 HC RX IP 250 OP 636: Performed by: FAMILY MEDICINE

## 2020-11-05 PROCEDURE — 80048 BASIC METABOLIC PNL TOTAL CA: CPT | Performed by: FAMILY MEDICINE

## 2020-11-05 PROCEDURE — 200N000001 HC R&B ICU

## 2020-11-05 PROCEDURE — 250N000009 HC RX 250: Performed by: FAMILY MEDICINE

## 2020-11-05 PROCEDURE — 85045 AUTOMATED RETICULOCYTE COUNT: CPT | Performed by: FAMILY MEDICINE

## 2020-11-05 PROCEDURE — 85025 COMPLETE CBC W/AUTO DIFF WBC: CPT | Performed by: FAMILY MEDICINE

## 2020-11-05 PROCEDURE — 99232 SBSQ HOSP IP/OBS MODERATE 35: CPT | Performed by: FAMILY MEDICINE

## 2020-11-05 RX ADMIN — AZITHROMYCIN 500 MG: 500 INJECTION, POWDER, LYOPHILIZED, FOR SOLUTION INTRAVENOUS at 17:00

## 2020-11-05 RX ADMIN — LOSARTAN POTASSIUM: 50 TABLET, FILM COATED ORAL at 09:33

## 2020-11-05 RX ADMIN — ENOXAPARIN SODIUM 50 MG: 60 INJECTION SUBCUTANEOUS at 09:33

## 2020-11-05 RX ADMIN — ATENOLOL 50 MG: 50 TABLET ORAL at 09:34

## 2020-11-05 RX ADMIN — MELATONIN TAB 3 MG 6 MG: 3 TAB at 21:32

## 2020-11-05 RX ADMIN — ENOXAPARIN SODIUM 50 MG: 60 INJECTION SUBCUTANEOUS at 21:33

## 2020-11-05 RX ADMIN — CEFTRIAXONE SODIUM 2 G: 2 INJECTION, SOLUTION INTRAVENOUS at 10:54

## 2020-11-05 RX ADMIN — DEXAMETHASONE SODIUM PHOSPHATE 6 MG: 4 INJECTION, SOLUTION INTRAMUSCULAR; INTRAVENOUS at 09:26

## 2020-11-05 RX ADMIN — REMDESIVIR 100 MG: 100 INJECTION, POWDER, LYOPHILIZED, FOR SOLUTION INTRAVENOUS at 09:26

## 2020-11-05 ASSESSMENT — ACTIVITIES OF DAILY LIVING (ADL)
ADLS_ACUITY_SCORE: 18
ADLS_ACUITY_SCORE: 16
ADLS_ACUITY_SCORE: 18
ADLS_ACUITY_SCORE: 16
ADLS_ACUITY_SCORE: 18
ADLS_ACUITY_SCORE: 16

## 2020-11-05 NOTE — PLAN OF CARE
Short of breath at rest and with activity, reports improvement since yesterday, up to bathroom without desaturation, SpO2 remains above 90% with exception of less than 30 seconds periodically on 75% FiO2. LS clear, sitting on edge of bed, reports difficulty sleeping, updated MD with orders for Melatonin 6 mg at bedtime.    Adri Blanchard RN on 11/4/2020 at 10:38 PM

## 2020-11-05 NOTE — PLAN OF CARE
Patient AAOx4, using call light appropriately, on high flow vapotherm to maintain O2 saturations above 90%, lung sounds diminished with faint crackles in lung bases, tolerates 15L high flow nasal cannula when ambulating to bathroom, mild dyspnea on exertion, good appetite today - ate breakfast and dinner. Brandon Schulz RN on 11/5/2020 at 4:55 PM

## 2020-11-05 NOTE — PROGRESS NOTES
LS coarse on right side with rhonchi to RLB, decreased shortness of breath through night, frequent cough unable to produce sputum, states overall feels better this morning.    Adri Blanchard RN on 11/5/2020 at 5:53 AM

## 2020-11-05 NOTE — PROGRESS NOTES
[facility]    Medicine Progress Note - Hospitalist Service       Date of Admission:  11/3/2020  Assessment & Plan             Remington Cook is a 65 year old male admitted on 11/3/2020. He was diagnosed with COVID-19 on October 28, 2020.  He presented to the emergency department with increasing shortness of breath, fever and diarrheal stools.    Patient was acutely hypoxic with saturations 84% on room air and febrile.  He required 5 L of oxygen via nasal cannula to keep sats greater than 90%.  He was admitted to the ICU treated with remdesivir, steroids and anticoagulated with Lovenox at 0.5 mg/kg.      Patient has evidence of bilateral pneumonia which is more likely viral but will continue azithromycin and added Rocephin.  He has not had fevers since admission and his procalcitonin was reassuring.  His inflammatory markers are now starting to fall and clinically he is improving.    He  continues on Vapotherm but is now on 9 L/min with 75% FiO2.    Principal Problem:    Pneumonia due to 2019 novel coronavirus    Assessment: Patient received 1 dose of Rocephin 6 days prior to admission, has been on a azithromycin.  I suspect symptoms are more likely due to ongoing infection with Covid.    Plan: Continue azithromycin, Rocephin.  I suspect this is predominantly viral pneumonia from Covid.  .  Active Problems:    Hypoxia    Assessment: Likely due to active COVID-19 infection.  He feels much better today and has been stable on Vapotherm    Plan: Continue dexamethasone 6 mg daily.  Albuterol inhaler if needed.  Lungs clear.  Continue remdesivir.  Continue Lovenox 0.5 mg/kg twice daily appropriate.    Obesity (BMI 35.0-39.9 without comorbidity)    Assessment: Independent risk factor.    Plan: Suggest long-term caloric reduction and increased exercise.  Hypertension   Assessment: Stable  Plan: Continue losartan/hydrochlorthiazide.  Hypokalemia  Assessment: Moderate  Plan: Potassium replacement  protocol         Diet: Regular Diet Adult    DVT Prophylaxis: Enoxaparin (Lovenox) SQ SQ twice daily  Llanos Catheter: not present  Code Status: Full Code           Disposition Plan   Expected discharge: 2 - 3 days, recommended to prior living arrangement once adequate pain management/ tolerating PO medications, O2 use less than 4 liters/minute and SIRS/Sepsis treated.  Entered: Braeden Cortez MD 11/05/2020, 12:00 PM       The patient's care was discussed with the Patient.    Braeden Cortez MD  Hospitalist Service  [facility]  Contact information available via [facility] Paging/Directory    ______________________________________________________________________    Interval History   Patient reports improvement in his breathing.  He reports he still has some shortness of breath but is not nearly as severe.  Exercise tolerance has improved as well.  Was able to walk back and forth to the bathroom about 5 times this morning using nasal cannula with high flow oxygen.  His appetite continues to be excellent.  Cough seems to be resolving as well.  No fevers.  No chest pain.  No new issues.  Anxiety has improved as well.    Data reviewed today: I reviewed all medications, new labs and imaging results over the last 24 hours. I personally reviewed no images or EKG's today.    Physical Exam   Vital Signs: Temp: 97.1  F (36.2  C) Temp src: Temporal BP: 138/87 Pulse: 63   Resp: 13 SpO2: 94 % O2 Device: High Flow Nasal Cannula (HFNC)(with humidification) Oxygen Delivery: 9 LPM  Weight: 220 lbs 14.41 oz  Constitutional: awake, alert, cooperative, no apparent distress, and appears stated age  Respiratory: No increased work of breathing, good air exchange, clear to auscultation bilaterally, no crackles or wheezing  Cardiovascular: Regularrate and rhythm.  No murmurs rubs or gallops heard.   GI: Abdomen Soft, non-tender.  No masses, rebound or guarding.   Musculoskeletal: No synovitis.  Muscle strength age and  body habitus appropriateas well as equal and even.   Neuropsychiatric: General: normal, calm and normal eye contact  Orientation: oriented to self, place, time and situation  Memory and insight: memory for past and recent events intact and thought process normal    Data   Recent Labs   Lab 11/05/20 0535 11/04/20 2005 11/04/20 0550 11/03/20  1206   WBC 9.0  --  7.4 11.0   HGB 14.1  --  13.5 14.7   MCV 97  --  96 95     --  243 268   INR 1.12  --  1.09 1.01     --  139 137   POTASSIUM 3.9 3.8 3.1* 3.3*   CHLORIDE 106  --  104 100   CO2 28  --  27 27   BUN 21  --  18 15   CR 0.73  --  0.72 0.95   ANIONGAP 8  --  8 10   OSWALDO 8.7  --  8.2* 8.8   *  --  153* 105   ALBUMIN  --   --   --  3.5   PROTTOTAL  --   --   --  7.3   BILITOTAL  --   --   --  0.6   ALKPHOS  --   --   --  53   ALT  --   --   --  35   AST  --   --   --  49*     No results found for this or any previous visit (from the past 24 hour(s)).

## 2020-11-05 NOTE — PROGRESS NOTES
Has had difficulty maintaining sats maria l 88-89 since last trip to .  Increased fio2 back to 75%.  See flowsheets.  Pt without c/o.

## 2020-11-06 LAB
ANION GAP SERPL CALCULATED.3IONS-SCNC: 7 MMOL/L (ref 3–14)
BASOPHILS # BLD AUTO: 0 10E9/L (ref 0–0.2)
BASOPHILS NFR BLD AUTO: 0.1 %
BUN SERPL-MCNC: 24 MG/DL (ref 7–25)
CALCIUM SERPL-MCNC: 8.6 MG/DL (ref 8.6–10.3)
CHLORIDE SERPL-SCNC: 106 MMOL/L (ref 98–107)
CO2 SERPL-SCNC: 28 MMOL/L (ref 21–31)
CREAT SERPL-MCNC: 0.82 MG/DL (ref 0.7–1.3)
CRP SERPL-MCNC: 12 MG/L
D DIMER PPP FEU-MCNC: <0.3 UG/ML FEU (ref 0–0.5)
DIFFERENTIAL METHOD BLD: ABNORMAL
EOSINOPHIL # BLD AUTO: 0 10E9/L (ref 0–0.7)
EOSINOPHIL NFR BLD AUTO: 0.2 %
ERYTHROCYTE [DISTWIDTH] IN BLOOD BY AUTOMATED COUNT: 12.3 % (ref 10–15)
FIBRINOGEN PPP-MCNC: 583 MG/DL (ref 200–420)
GFR SERPL CREATININE-BSD FRML MDRD: >90 ML/MIN/{1.73_M2}
GLUCOSE SERPL-MCNC: 108 MG/DL (ref 70–105)
HCT VFR BLD AUTO: 41.6 % (ref 40–53)
HGB BLD-MCNC: 14 G/DL (ref 13.3–17.7)
IMM GRANULOCYTES # BLD: 0.1 10E9/L (ref 0–0.4)
IMM GRANULOCYTES NFR BLD: 0.6 %
INR PPP: 1.18 (ref 0.86–1.14)
LDH SERPL L TO P-CCNC: 326 U/L (ref 140–271)
LYMPHOCYTES # BLD AUTO: 2 10E9/L (ref 0.8–5.3)
LYMPHOCYTES NFR BLD AUTO: 17.6 %
MCH RBC QN AUTO: 32.6 PG (ref 26.5–33)
MCHC RBC AUTO-ENTMCNC: 33.7 G/DL (ref 31.5–36.5)
MCV RBC AUTO: 97 FL (ref 78–100)
MONOCYTES # BLD AUTO: 0.5 10E9/L (ref 0–1.3)
MONOCYTES NFR BLD AUTO: 4.7 %
NEUTROPHILS # BLD AUTO: 8.7 10E9/L (ref 1.6–8.3)
NEUTROPHILS NFR BLD AUTO: 76.8 %
PLATELET # BLD AUTO: 321 10E9/L (ref 150–450)
POTASSIUM SERPL-SCNC: 3.9 MMOL/L (ref 3.5–5.1)
RBC # BLD AUTO: 4.29 10E12/L (ref 4.4–5.9)
RETICS # AUTO: 34.3 10E9/L (ref 25–95)
RETICS/RBC NFR AUTO: 0.8 % (ref 0.5–2)
SODIUM SERPL-SCNC: 141 MMOL/L (ref 134–144)
TROPONIN I SERPL-MCNC: 5.2 PG/ML
WBC # BLD AUTO: 11.3 10E9/L (ref 4–11)

## 2020-11-06 PROCEDURE — 999N000157 HC STATISTIC RCP TIME EA 10 MIN

## 2020-11-06 PROCEDURE — 250N000009 HC RX 250: Performed by: FAMILY MEDICINE

## 2020-11-06 PROCEDURE — 200N000001 HC R&B ICU

## 2020-11-06 PROCEDURE — 250N000013 HC RX MED GY IP 250 OP 250 PS 637: Performed by: FAMILY MEDICINE

## 2020-11-06 PROCEDURE — 250N000011 HC RX IP 250 OP 636: Performed by: FAMILY MEDICINE

## 2020-11-06 PROCEDURE — 86140 C-REACTIVE PROTEIN: CPT | Performed by: FAMILY MEDICINE

## 2020-11-06 PROCEDURE — 36415 COLL VENOUS BLD VENIPUNCTURE: CPT | Performed by: FAMILY MEDICINE

## 2020-11-06 PROCEDURE — 85045 AUTOMATED RETICULOCYTE COUNT: CPT | Performed by: FAMILY MEDICINE

## 2020-11-06 PROCEDURE — 85610 PROTHROMBIN TIME: CPT | Performed by: FAMILY MEDICINE

## 2020-11-06 PROCEDURE — 80048 BASIC METABOLIC PNL TOTAL CA: CPT | Performed by: FAMILY MEDICINE

## 2020-11-06 PROCEDURE — 99233 SBSQ HOSP IP/OBS HIGH 50: CPT | Performed by: FAMILY MEDICINE

## 2020-11-06 PROCEDURE — 84484 ASSAY OF TROPONIN QUANT: CPT | Performed by: FAMILY MEDICINE

## 2020-11-06 PROCEDURE — 85379 FIBRIN DEGRADATION QUANT: CPT | Performed by: FAMILY MEDICINE

## 2020-11-06 PROCEDURE — 85384 FIBRINOGEN ACTIVITY: CPT | Performed by: FAMILY MEDICINE

## 2020-11-06 PROCEDURE — 85025 COMPLETE CBC W/AUTO DIFF WBC: CPT | Performed by: FAMILY MEDICINE

## 2020-11-06 PROCEDURE — 83615 LACTATE (LD) (LDH) ENZYME: CPT | Performed by: FAMILY MEDICINE

## 2020-11-06 PROCEDURE — 258N000003 HC RX IP 258 OP 636: Performed by: FAMILY MEDICINE

## 2020-11-06 RX ADMIN — LOSARTAN POTASSIUM: 50 TABLET, FILM COATED ORAL at 10:17

## 2020-11-06 RX ADMIN — AZITHROMYCIN 500 MG: 500 INJECTION, POWDER, LYOPHILIZED, FOR SOLUTION INTRAVENOUS at 17:01

## 2020-11-06 RX ADMIN — DEXAMETHASONE SODIUM PHOSPHATE 6 MG: 4 INJECTION, SOLUTION INTRAMUSCULAR; INTRAVENOUS at 10:14

## 2020-11-06 RX ADMIN — CEFTRIAXONE SODIUM 2 G: 2 INJECTION, SOLUTION INTRAVENOUS at 13:27

## 2020-11-06 RX ADMIN — REMDESIVIR 100 MG: 100 INJECTION, POWDER, LYOPHILIZED, FOR SOLUTION INTRAVENOUS at 10:18

## 2020-11-06 RX ADMIN — ENOXAPARIN SODIUM 50 MG: 60 INJECTION SUBCUTANEOUS at 10:13

## 2020-11-06 RX ADMIN — ENOXAPARIN SODIUM 50 MG: 60 INJECTION SUBCUTANEOUS at 21:05

## 2020-11-06 RX ADMIN — MELATONIN TAB 3 MG 6 MG: 3 TAB at 21:06

## 2020-11-06 ASSESSMENT — ACTIVITIES OF DAILY LIVING (ADL)
ADLS_ACUITY_SCORE: 17
ADLS_ACUITY_SCORE: 16
ADLS_ACUITY_SCORE: 17
ADLS_ACUITY_SCORE: 17

## 2020-11-06 NOTE — PLAN OF CARE
Rested well through the night with no changes in assessment or respiratory status.    Adri Blanchard RN on 11/6/2020 at 4:51 AM

## 2020-11-06 NOTE — PROGRESS NOTES
Sitting on edge of bed without shortness of breath, dyspneic on exertion, head of bed elevated for ease in respiratory effort, encouraged to cough and deep breath, LS diminished but clear, ambulated to bathroom with high flow nasal canula and SpO2 did not drop below 90%, bradycardic since beginning of shift, updated Dr. Cortez with order to discontinue Atenolol.     Adri Blanchard RN on 11/5/2020 at 11:18 PM

## 2020-11-06 NOTE — PROGRESS NOTES
[facility]  Hospitalist Progress Note      Assessment & Plan   Remington Cook is a 65 year old male who was admitted on 11/3/2020     Pneumonia due to 2019 novel coronavirus  Acute Hypoxic respiratory failure. Able to be weaned off vapotherm today. No fevers since admission. Currently on 6L NC oxygen.  -ceftriaxone 3rd dose, azithromcyin 4th dose  -oxygen to maintain sats >90%  -remdesivir day 3  -dexamethasone day 3  -troponin negative  -CRP trending down today. LDH trending down  -albuterol prn  -daily COVID labs  -anticoagulation    Obesity (BMI 35.0-39.9 without comorbidity). Noted.     HTN  -continue losartan hydrochlorothiazide    Hypokalemia. Resolved.    DVT Prophylaxis: Enoxaparin (Lovenox) SQ  Code Status: Full Code    Suzie Arroyo    Interval History   Patient feels much improved today.  Asking when he can go home.  However, was on Vapotherm overnight and was just able to be weaned to 6 L nasal cannula this morning.  Discussed plan of care for discharge.  He needs to be on less than 4 L nasal cannula oxygen.  He did not come in on oxygen so this would be a new change for him.  He denies shortness of breath or chest pain.  No fevers.  No new concerns.  No leg pain or swelling.    -Data reviewed today: I reviewed all new labs and imaging results over the last 24 hours. I personally reviewed no images or EKG's today.    Physical Exam   Temp: 97.5  F (36.4  C) Temp src: Temporal BP: 128/85 Pulse: 76   Resp: 16 SpO2: 94 % O2 Device: High Flow Nasal Cannula (HFNC) Oxygen Delivery: 25 LPM  Vitals:    11/03/20 1128 11/04/20 0622   Weight: 99.8 kg (220 lb) 100.2 kg (220 lb 14.4 oz)     Vital Signs with Ranges  Temp:  [96  F (35.6  C)-97.5  F (36.4  C)] 97.5  F (36.4  C)  Pulse:  [42-76] 76  Resp:  [12-23] 16  BP: ()/() 128/85  FiO2 (%):  [55 %-80 %] 60 %  SpO2:  [88 %-97 %] 94 %  I/O last 3 completed shifts:  In: 1050 [P.O.:595; I.V.:455]  Out: -     Constitutional: Awake, alert  and oriented.  Overweight.  No acute distress  Respiratory: Diminished.  Poor inspiratory effort.  No increased work of breathing or accessory muscle use.  Cardiovascular: Regular rate.  No murmur.  GI: Abdomen is soft, nontender, nondistended.  Skin/Integumen: Warm, dry, intact.  No concerning rashes or petechiae.  Other:      Medications     - MEDICATION INSTRUCTIONS -       sodium chloride 10 mL/hr at 11/05/20 1700       azithromycin  500 mg Intravenous Q24H     cefTRIAXone  2 g Intravenous Q24H     dexamethasone  6 mg Intravenous Q24H     enoxaparin ANTICOAGULANT  0.5 mg/kg Subcutaneous BID     losartan-hydrochlorothiazide (HYZAAR) 100-12.5 mg combo dose   Oral Daily     remdesivir  100 mg Intravenous Q24H       Data   Recent Labs   Lab 11/06/20  0620 11/05/20  0535 11/04/20 2005 11/04/20  0550 11/03/20  1206   WBC 11.3* 9.0  --  7.4 11.0   HGB 14.0 14.1  --  13.5 14.7   MCV 97 97  --  96 95    294  --  243 268   INR 1.18* 1.12  --  1.09 1.01    142  --  139 137   POTASSIUM 3.9 3.9 3.8 3.1* 3.3*   CHLORIDE 106 106  --  104 100   CO2 28 28  --  27 27   BUN 24 21  --  18 15   CR 0.82 0.73  --  0.72 0.95   ANIONGAP 7 8  --  8 10   OSWALDO 8.6 8.7  --  8.2* 8.8   * 122*  --  153* 105   ALBUMIN  --   --   --   --  3.5   PROTTOTAL  --   --   --   --  7.3   BILITOTAL  --   --   --   --  0.6   ALKPHOS  --   --   --   --  53   ALT  --   --   --   --  35   AST  --   --   --   --  49*       No results found for this or any previous visit (from the past 24 hour(s)).

## 2020-11-07 LAB
ANION GAP SERPL CALCULATED.3IONS-SCNC: 7 MMOL/L (ref 3–14)
BASOPHILS # BLD AUTO: 0 10E9/L (ref 0–0.2)
BASOPHILS NFR BLD AUTO: 0.1 %
BUN SERPL-MCNC: 20 MG/DL (ref 7–25)
CALCIUM SERPL-MCNC: 8.9 MG/DL (ref 8.6–10.3)
CHLORIDE SERPL-SCNC: 104 MMOL/L (ref 98–107)
CO2 SERPL-SCNC: 27 MMOL/L (ref 21–31)
CREAT SERPL-MCNC: 0.74 MG/DL (ref 0.7–1.3)
CRP SERPL-MCNC: 9.6 MG/L
D DIMER PPP FEU-MCNC: <0.3 UG/ML FEU (ref 0–0.5)
DIFFERENTIAL METHOD BLD: NORMAL
EOSINOPHIL # BLD AUTO: 0 10E9/L (ref 0–0.7)
EOSINOPHIL NFR BLD AUTO: 0.3 %
ERYTHROCYTE [DISTWIDTH] IN BLOOD BY AUTOMATED COUNT: 12.2 % (ref 10–15)
FIBRINOGEN PPP-MCNC: 582 MG/DL (ref 200–420)
GFR SERPL CREATININE-BSD FRML MDRD: >90 ML/MIN/{1.73_M2}
GLUCOSE SERPL-MCNC: 108 MG/DL (ref 70–105)
HCO3 BLD-SCNC: 24 MMOL/L (ref 21–28)
HCT VFR BLD AUTO: 43.3 % (ref 40–53)
HGB BLD-MCNC: 14.7 G/DL (ref 13.3–17.7)
IMM GRANULOCYTES # BLD: 0.1 10E9/L (ref 0–0.4)
IMM GRANULOCYTES NFR BLD: 0.9 %
INR PPP: 1.13 (ref 0.86–1.14)
LDH SERPL L TO P-CCNC: 296 U/L (ref 140–271)
LYMPHOCYTES # BLD AUTO: 1.4 10E9/L (ref 0.8–5.3)
LYMPHOCYTES NFR BLD AUTO: 14.5 %
MCH RBC QN AUTO: 32.5 PG (ref 26.5–33)
MCHC RBC AUTO-ENTMCNC: 33.9 G/DL (ref 31.5–36.5)
MCV RBC AUTO: 96 FL (ref 78–100)
MONOCYTES # BLD AUTO: 0.5 10E9/L (ref 0–1.3)
MONOCYTES NFR BLD AUTO: 5 %
NEUTROPHILS # BLD AUTO: 7.6 10E9/L (ref 1.6–8.3)
NEUTROPHILS NFR BLD AUTO: 79.2 %
O2/TOTAL GAS SETTING VFR VENT: 44 %
OXYHGB MFR BLD: 94 % (ref 92–100)
PCO2 BLD: 30 MM HG (ref 35–45)
PH BLD: 7.51 PH (ref 7.35–7.45)
PLATELET # BLD AUTO: 352 10E9/L (ref 150–450)
PO2 BLD: 73 MM HG (ref 80–105)
POTASSIUM SERPL-SCNC: 3.8 MMOL/L (ref 3.5–5.1)
RBC # BLD AUTO: 4.52 10E12/L (ref 4.4–5.9)
RETICS # AUTO: 31.2 10E9/L (ref 25–95)
RETICS/RBC NFR AUTO: 0.7 % (ref 0.5–2)
SODIUM SERPL-SCNC: 138 MMOL/L (ref 134–144)
WBC # BLD AUTO: 9.7 10E9/L (ref 4–11)

## 2020-11-07 PROCEDURE — 85384 FIBRINOGEN ACTIVITY: CPT | Performed by: FAMILY MEDICINE

## 2020-11-07 PROCEDURE — 250N000011 HC RX IP 250 OP 636: Performed by: FAMILY MEDICINE

## 2020-11-07 PROCEDURE — 85045 AUTOMATED RETICULOCYTE COUNT: CPT | Performed by: FAMILY MEDICINE

## 2020-11-07 PROCEDURE — 82805 BLOOD GASES W/O2 SATURATION: CPT | Performed by: FAMILY MEDICINE

## 2020-11-07 PROCEDURE — 80048 BASIC METABOLIC PNL TOTAL CA: CPT | Performed by: FAMILY MEDICINE

## 2020-11-07 PROCEDURE — 250N000013 HC RX MED GY IP 250 OP 250 PS 637: Performed by: FAMILY MEDICINE

## 2020-11-07 PROCEDURE — 86140 C-REACTIVE PROTEIN: CPT | Performed by: FAMILY MEDICINE

## 2020-11-07 PROCEDURE — 83615 LACTATE (LD) (LDH) ENZYME: CPT | Performed by: FAMILY MEDICINE

## 2020-11-07 PROCEDURE — 99233 SBSQ HOSP IP/OBS HIGH 50: CPT | Performed by: FAMILY MEDICINE

## 2020-11-07 PROCEDURE — 85610 PROTHROMBIN TIME: CPT | Performed by: FAMILY MEDICINE

## 2020-11-07 PROCEDURE — 250N000009 HC RX 250: Performed by: FAMILY MEDICINE

## 2020-11-07 PROCEDURE — 36415 COLL VENOUS BLD VENIPUNCTURE: CPT | Performed by: FAMILY MEDICINE

## 2020-11-07 PROCEDURE — 999N000157 HC STATISTIC RCP TIME EA 10 MIN

## 2020-11-07 PROCEDURE — 258N000003 HC RX IP 258 OP 636: Performed by: FAMILY MEDICINE

## 2020-11-07 PROCEDURE — 120N000001 HC R&B MED SURG/OB

## 2020-11-07 PROCEDURE — 85025 COMPLETE CBC W/AUTO DIFF WBC: CPT | Performed by: FAMILY MEDICINE

## 2020-11-07 PROCEDURE — 85379 FIBRIN DEGRADATION QUANT: CPT | Performed by: FAMILY MEDICINE

## 2020-11-07 RX ORDER — ATENOLOL 50 MG/1
50 TABLET ORAL DAILY
Status: DISCONTINUED | OUTPATIENT
Start: 2020-11-07 | End: 2020-11-08 | Stop reason: HOSPADM

## 2020-11-07 RX ADMIN — LOSARTAN POTASSIUM: 50 TABLET, FILM COATED ORAL at 10:25

## 2020-11-07 RX ADMIN — ENOXAPARIN SODIUM 50 MG: 60 INJECTION SUBCUTANEOUS at 22:36

## 2020-11-07 RX ADMIN — REMDESIVIR 100 MG: 100 INJECTION, POWDER, LYOPHILIZED, FOR SOLUTION INTRAVENOUS at 10:26

## 2020-11-07 RX ADMIN — AZITHROMYCIN 500 MG: 500 INJECTION, POWDER, LYOPHILIZED, FOR SOLUTION INTRAVENOUS at 17:19

## 2020-11-07 RX ADMIN — DEXAMETHASONE SODIUM PHOSPHATE 6 MG: 4 INJECTION, SOLUTION INTRAMUSCULAR; INTRAVENOUS at 10:25

## 2020-11-07 RX ADMIN — ENOXAPARIN SODIUM 50 MG: 60 INJECTION SUBCUTANEOUS at 10:25

## 2020-11-07 RX ADMIN — CEFTRIAXONE SODIUM 2 G: 2 INJECTION, SOLUTION INTRAVENOUS at 10:25

## 2020-11-07 RX ADMIN — MELATONIN TAB 3 MG 6 MG: 3 TAB at 22:36

## 2020-11-07 RX ADMIN — ATENOLOL 50 MG: 50 TABLET ORAL at 12:12

## 2020-11-07 ASSESSMENT — ACTIVITIES OF DAILY LIVING (ADL)
ADLS_ACUITY_SCORE: 16

## 2020-11-07 ASSESSMENT — MIFFLIN-ST. JEOR: SCORE: 1681.88

## 2020-11-07 NOTE — PROGRESS NOTES
Offered pt to order dinner, pt declined, states that he just had lunch at 1430 an dis not currently hungry. Pt asked if he could have a snack later, this writer assured him that he could. Pt asked if could have some ice cream in about an hour.

## 2020-11-07 NOTE — PROGRESS NOTES
Pt chart accessed pending admission to [facility]. Cyndi Jacobs RN on 11/7/2020 at 1:21 PM    Report received from Rosana BENITEZ All questions answered. Cyndi Jacobs RN on 11/7/2020 at 1:27 PM

## 2020-11-07 NOTE — PROGRESS NOTES
Pt has been pleasant and cooperative this shift. Pt has been independent in room. Pt does need occasional encouragement to be independent.

## 2020-11-07 NOTE — PROGRESS NOTES
[facility]  Hospitalist Progress Note      Assessment & Plan   Remington Cook is a 65 year old male who was admitted on 11/3/2020     Pneumonia due to 2019 novel coronavirus  Acute Hypoxic respiratory failure. Able to be weaned off vapotherm today. No fevers since admission. Currently on 6L NC oxygen.  -transfer to general medical floor  -ceftriaxone azithromcyin, day 4.  -oxygen to maintain sats >90%  -remdesivir day 4  -dexamethasone day 4  -troponin negative  -CRP trending down today. LDH trending down  -albuterol prn  -anticoagulation    Tachycardia. Home atenolol has been on hold. Restart    Obesity (BMI 35.0-39.9 without comorbidity). Noted.     HTN  -continue losartan hydrochlorothiazide    Hypokalemia. Resolved.    DVT Prophylaxis: Enoxaparin (Lovenox) SQ  Code Status: Full Code    Suzie EH Arroyo    Interval History   Patient feels much improved today.  Asking when he can go home.  Discussed plan of care for discharge.  He needs to be on less than 4 L nasal cannula oxygen.  He did not come in on oxygen so this would be a new change for him.  He denies shortness of breath or chest pain.  No fevers.  No new concerns.  No leg pain or swelling. Eating and drinking well.     -Data reviewed today: I reviewed all new labs and imaging results over the last 24 hours. I personally reviewed no images or EKG's today.    Physical Exam   Temp: 97.4  F (36.3  C) Temp src: Temporal BP: (!) 130/109 Pulse: 80   Resp: 11 SpO2: 90 % O2 Device: Nasal cannula Oxygen Delivery: 5 LPM  Vitals:    11/03/20 1128 11/04/20 0622 11/07/20 0005   Weight: 99.8 kg (220 lb) 100.2 kg (220 lb 14.4 oz) 97.5 kg (214 lb 15.2 oz)     Vital Signs with Ranges  Temp:  [97.3  F (36.3  C)-98.2  F (36.8  C)] 97.4  F (36.3  C)  Pulse:  [49-98] 80  Resp:  [5-141] 11  BP: (129-161)/() 130/109  SpO2:  [87 %-97 %] 90 %  No intake/output data recorded.    Constitutional: Awake, alert and oriented.  Overweight.  No acute  distress  Respiratory: Diminished.  Poor inspiratory effort.  No increased work of breathing or accessory muscle use.  Cardiovascular: Regular rate.  No murmur.  GI: Abdomen is soft, nontender, nondistended.  Skin/Integumen: Warm, dry, intact.  No concerning rashes or petechiae.  Other:      Medications     - MEDICATION INSTRUCTIONS -         azithromycin  500 mg Intravenous Q24H     cefTRIAXone  2 g Intravenous Q24H     dexamethasone  6 mg Intravenous Q24H     enoxaparin ANTICOAGULANT  0.5 mg/kg Subcutaneous BID     losartan-hydrochlorothiazide (HYZAAR) 100-12.5 mg combo dose   Oral Daily       Data   Recent Labs   Lab 11/07/20  0505 11/06/20  0620 11/05/20  0535 11/03/20  1206 11/03/20  1206   WBC 9.7 11.3* 9.0   < > 11.0   HGB 14.7 14.0 14.1   < > 14.7   MCV 96 97 97   < > 95    321 294   < > 268   INR 1.13 1.18* 1.12   < > 1.01    141 142   < > 137   POTASSIUM 3.8 3.9 3.9   < > 3.3*   CHLORIDE 104 106 106   < > 100   CO2 27 28 28   < > 27   BUN 20 24 21   < > 15   CR 0.74 0.82 0.73   < > 0.95   ANIONGAP 7 7 8   < > 10   OSWALDO 8.9 8.6 8.7   < > 8.8   * 108* 122*   < > 105   ALBUMIN  --   --   --   --  3.5   PROTTOTAL  --   --   --   --  7.3   BILITOTAL  --   --   --   --  0.6   ALKPHOS  --   --   --   --  53   ALT  --   --   --   --  35   AST  --   --   --   --  49*    < > = values in this interval not displayed.       No results found for this or any previous visit (from the past 24 hour(s)).

## 2020-11-07 NOTE — PROGRESS NOTES
Pt reports no pan, no SOB. HR guera - asymptomatic. HFNC at 6L - sating at 93%. Productive cough present. Independent in room. Son updated on condition.

## 2020-11-08 VITALS
HEIGHT: 65 IN | OXYGEN SATURATION: 94 % | SYSTOLIC BLOOD PRESSURE: 132 MMHG | TEMPERATURE: 96.8 F | DIASTOLIC BLOOD PRESSURE: 67 MMHG | RESPIRATION RATE: 22 BRPM | HEART RATE: 69 BPM | BODY MASS INDEX: 35.81 KG/M2 | WEIGHT: 214.95 LBS

## 2020-11-08 LAB
ANION GAP SERPL CALCULATED.3IONS-SCNC: 8 MMOL/L (ref 3–14)
BUN SERPL-MCNC: 21 MG/DL (ref 7–25)
CALCIUM SERPL-MCNC: 8.5 MG/DL (ref 8.6–10.3)
CHLORIDE SERPL-SCNC: 104 MMOL/L (ref 98–107)
CO2 SERPL-SCNC: 25 MMOL/L (ref 21–31)
CREAT SERPL-MCNC: 0.72 MG/DL (ref 0.7–1.3)
ERYTHROCYTE [DISTWIDTH] IN BLOOD BY AUTOMATED COUNT: 12 % (ref 10–15)
GFR SERPL CREATININE-BSD FRML MDRD: >90 ML/MIN/{1.73_M2}
GLUCOSE SERPL-MCNC: 104 MG/DL (ref 70–105)
HCT VFR BLD AUTO: 43.4 % (ref 40–53)
HGB BLD-MCNC: 14.6 G/DL (ref 13.3–17.7)
MCH RBC QN AUTO: 31.7 PG (ref 26.5–33)
MCHC RBC AUTO-ENTMCNC: 33.6 G/DL (ref 31.5–36.5)
MCV RBC AUTO: 94 FL (ref 78–100)
PLATELET # BLD AUTO: 373 10E9/L (ref 150–450)
POTASSIUM SERPL-SCNC: 4.2 MMOL/L (ref 3.5–5.1)
RBC # BLD AUTO: 4.6 10E12/L (ref 4.4–5.9)
SODIUM SERPL-SCNC: 137 MMOL/L (ref 134–144)
WBC # BLD AUTO: 9.5 10E9/L (ref 4–11)

## 2020-11-08 PROCEDURE — 80048 BASIC METABOLIC PNL TOTAL CA: CPT | Performed by: FAMILY MEDICINE

## 2020-11-08 PROCEDURE — 250N000011 HC RX IP 250 OP 636: Performed by: FAMILY MEDICINE

## 2020-11-08 PROCEDURE — 250N000013 HC RX MED GY IP 250 OP 250 PS 637: Performed by: FAMILY MEDICINE

## 2020-11-08 PROCEDURE — 36415 COLL VENOUS BLD VENIPUNCTURE: CPT | Performed by: FAMILY MEDICINE

## 2020-11-08 PROCEDURE — 85027 COMPLETE CBC AUTOMATED: CPT | Performed by: FAMILY MEDICINE

## 2020-11-08 PROCEDURE — 99239 HOSP IP/OBS DSCHRG MGMT >30: CPT | Performed by: FAMILY MEDICINE

## 2020-11-08 RX ADMIN — LOSARTAN POTASSIUM: 50 TABLET, FILM COATED ORAL at 10:09

## 2020-11-08 RX ADMIN — ENOXAPARIN SODIUM 50 MG: 60 INJECTION SUBCUTANEOUS at 10:09

## 2020-11-08 RX ADMIN — CEFTRIAXONE SODIUM 2 G: 2 INJECTION, SOLUTION INTRAVENOUS at 10:10

## 2020-11-08 RX ADMIN — ATENOLOL 50 MG: 50 TABLET ORAL at 10:10

## 2020-11-08 RX ADMIN — DEXAMETHASONE SODIUM PHOSPHATE 6 MG: 4 INJECTION, SOLUTION INTRAMUSCULAR; INTRAVENOUS at 10:17

## 2020-11-08 ASSESSMENT — ACTIVITIES OF DAILY LIVING (ADL)
ADLS_ACUITY_SCORE: 16

## 2020-11-08 NOTE — PROGRESS NOTES
I certify that this patient, Remington Cook has been under my care (or a nurse practitioner or physican's assistant working with me). This is the face-to-face encounter for oxygen medical necessity.      Remington Cook is now in a chronic stable state and continues to require supplemental oxygen. Patient has continued oxygen desaturation due to COVID.    Alternative treatment(s) tried or considered and deemed clinically infective for treatment of COVID include inhalers, steroids and pulmonary toileting.  If portability is ordered, is the patient mobile within the home? yes    **Patients who qualify for home O2 coverage under the CMS guidelines require ABG tests or O2 sat readings obtained closest to, but no earlier than 2 days prior to the discharge, as evidence of the need for home oxygen therapy. Testing must be performed while patient is in the chronic stable state. See notes for O2 sats.**

## 2020-11-08 NOTE — PROGRESS NOTES
[facility]AMES[facility] DISCHARGE NOTE    Patient discharged to home at 12:28 PM via wheel chair. Accompanied by staff. Discharge instructions reviewed with patient, opportunity offered to ask questions. Prescriptions sent to patients preferred pharmacy. All belongings sent with patient.    Nazario Matos RN

## 2020-11-08 NOTE — PHARMACY
Pharmacy - Transfer Medication Reconciliation     The patient's transfer medication orders have been compared to the medication administration record and to the Prior to Admissions Medications list - any noted discrepancies were resolved with the MD.     Thank you. Pharmacy will continue to monitor.     Vic Boswell MISSY ....................  11/7/2020   9:48 PM

## 2020-11-08 NOTE — PHARMACY - DISCHARGE MEDICATION RECONCILIATION AND EDUCATION
Pharmacy:  Discharge Counseling and Medication Reconciliation    Remington Cook  [address]  [place] [address]  [phone] (home)   65 year old male  PCP: No Ref-Primary, Physician    Allergies: Patient has no known allergies.    Discharge Counseling:    Pharmacist met with patient (and/or family) today to review the medication portion of the After Visit Summary (with an emphasis on NEW medications) and to address patient's questions/concerns.    Summary of Education: Spoke with patient over the phone as patient had already left the building.  I had called [facility] earlier to check on coverage of Rivaroxaban and it was close to $400 with the patient's insurance.  Spoke with patient and changed rx to [facility] under the [facility] program.  Patient will come tomorrow to .  Rx cancelled at [facility] and resent to [facility].    Materials Provided:  MedCounselor sheets printed from Clinical Pharmacology on: none - patient already discharged.    Discharge Medication Reconciliation:    It has been determined that the patient has an adequate supply of medications available or which can be obtained from the patient's preferred pharmacy, which HE/SHE has confirmed as: [facility]    Thank you for the consult.    Gabrielle Noble RP........November 8, 2020 12:48 PM

## 2020-11-08 NOTE — PROGRESS NOTES
Per RT Miles, ran trial on room air. Patient has been maintaining 90-92% but occasionally has dipped to 87-89%.

## 2020-11-08 NOTE — PLAN OF CARE
"/75 (BP Location: Right arm)   Pulse 56   Temp 96.9  F (36.1  C) (Tympanic)   Resp 20   Ht 1.651 m (5' 5\")   Wt 97.5 kg (214 lb 15.2 oz)   SpO2 93%   BMI 35.77 kg/m    VS.  Oxygen increased to 5L to maintain 93%.  Pt denies SOB and does not appear to be in distress.  Pt very agitated when assessing vitals and especially oxygen levels stating \"Jay Manuel can't I get any sleep around here\".  Pt was educated on the importance of adequate oxygenation.  Will continue to monitor.  Sharee Cortez RN.............................11/8/2020 4:41 AM    "

## 2020-11-08 NOTE — PROGRESS NOTES
"Patient is alert and orientated. No complaints of pain for me. VSS. Frequent productive cough. Clear lungs. Heart regular. Stable O2 sats on 3 LPM nasal cannula. Will continue to monitor.    /67 (BP Location: Right arm)   Pulse 69   Temp 96.8  F (36  C) (Tympanic)   Resp 22   Ht 1.651 m (5' 5\")   Wt 97.5 kg (214 lb 15.2 oz)   SpO2 94%   BMI 35.77 kg/m      "

## 2020-11-08 NOTE — PROGRESS NOTES
Patient has been assessed for Home Oxygen needs. Oxygen readings:    *Pulse oximetry (SpO2) = 87% on room air at rest while awake.    *SpO2 improved to 93% on 3iters/minute at rest.    *SpO2 = 87% on room air during activity/with exercise.    *SpO2 improved to 91% on 3liters/minute during activity/with exercise.  RT Yon on 11/8/2020 at 11:10 AM

## 2020-11-08 NOTE — PROGRESS NOTES
Pt transferred from ICU at 1632. Denies pain. Independent in room. Antibiotic given. O2 turned down 2.5L via NC at 94%. No further needs. Cyndi Jacobs RN on 11/7/2020 at 6:52 PM

## 2020-11-08 NOTE — DISCHARGE SUMMARY
[facility]    Discharge Summary  Hospitalist    Date of Admission:  11/3/2020  Date of Discharge:  11/8/2020 12:28 PM  Discharging Provider: Suzie Arroyo  Date of Service (when I saw the patient): 11/8/20    Discharge Diagnoses   Principal Problem:    Pneumonia due to 2019 novel coronavirus (11/3/2020), POA  Active Problems:  Acute hypoxic respiratory failure. POA    Obesity (BMI 35.0-39.9 without comorbidity) (11/3/2020)      History of Present Illness   Remington Cook is an 65 year old male who presented with He was diagnosed with COVID-19 on October 28, 2020.  He presented to the emergency department with increasing shortness of breath, fever and diarrheal stools.    Hospital Course   Remington Cook was admitted on 11/3/2020.  The following problems were addressed during his hospitalization:    Pneumonia due to 2019 novel coronavirus. intially admitted to ICU for vapotherm. Able to be weaned to [place] oxygen at time of discharge. CXR showed B/L pneumonia so he was given rocephin and azithro, completed course of therapy in hospital. Completed course of remdesivir and dexamethasone. Inflammatory markers trending down at time of discharge. Placed on anticoagulation for 30 days post hospitalization.   Acute Hypoxic respiratory failure due to COVID. Able to be weaned off vapotherm today. No fevers since      Tachycardia. Home atenolol has been on hold. Restart     Obesity (BMI 35.0-39.9 without comorbidity). Noted.      HTNcontinue losartan hydrochlorothiazide     Hypokalemia. Resolved with IVF and replacement.        Suzie Arroyo, DO    Significant Results and Procedures   n/a    Pending Results   These results will be followed up by PCP  Unresulted Labs Ordered in the Past 30 Days of this Admission     No orders found from 10/4/2020 to 11/4/2020.          Code Status   Full Code       Primary Care Physician   Physician No Ref-Primary    Physical Exam                      Vitals:    11/03/20 1128  11/04/20 0622 11/07/20 0005   Weight: 99.8 kg (220 lb) 100.2 kg (220 lb 14.4 oz) 97.5 kg (214 lb 15.2 oz)     Vital Signs with Ranges     I/O last 3 completed shifts:  In: 800 [P.O.:250; I.V.:550]  Out: -     Constitutional: Awake, alert and oriented.  Overweight.  No acute distress  Respiratory:     Diminished.  No increased work of breathing or accessory muscle use.  Cardiovascular: Regular rate.  No murmur.  GI: Abdomen is soft, nontender, nondistended.  Skin/Integumen: Warm, dry, intact.  No concerning rashes or petechiae    Discharge Disposition   Discharged to home  Condition at discharge: Stable    Consultations This Hospital Stay   None    Time Spent on this Encounter   ISuzie DO, personally saw the patient today and spent greater than 30 minutes discharging this patient.    Discharge Orders      Reason for your hospital stay    COVID     Follow-up and recommended labs and tests     Follow up with primary care provider, Physician No Ref-Primary, within 7 days to evaluate medication change and for hospital follow- up.  No follow up labs or test are needed. When to stop xarelto. Recommendations are 30 days.     Activity    Your activity upon discharge: activity as tolerated     Full Code     Oxygen Adult/Peds    Oxygen Documentation:   I certify that this patient, Remington Cook has been under my care (or a nurse practitioner or physican's assistant working with me). This is the face-to-face encounter for oxygen medical necessity.      Remington Cook is now in a chronic stable state and continues to require supplemental oxygen. Patient has continued oxygen desaturation due to COVID.    Alternative treatment(s) tried or considered and deemed clinically infective for treatment of COVID include  inhalers and steroids.  If portability is ordered, is the patient mobile within the home? yes    **Patients who qualify for home O2 coverage under the CMS guidelines require ABG tests or O2 sat readings obtained  closest to, but no earlier than 2 days prior to the discharge, as evidence of the need for home oxygen therapy. Testing must be performed while patient is in the chronic stable state. See notes for O2 sats.**     Diet    Follow this diet upon discharge: Orders Placed This Encounter      Regular Diet Adult     Discharge Medications   Discharge Medication List as of 11/8/2020 11:36 AM      START taking these medications    Details   rivaroxaban ANTICOAGULANT (XARELTO ANTICOAGULANT) 10 MG TABS tablet Take 1 tablet (10 mg) by mouth daily (with dinner), Disp-30 tablet, R-0, E-Prescribe         CONTINUE these medications which have NOT CHANGED    Details   acetaminophen (TYLENOL) 500 MG tablet Take 1,000 mg by mouth every 6 hours as needed for mild pain, Historical      atenolol (TENORMIN) 50 MG tablet Take 50 mg by mouth daily, Historical      losartan-hydrochlorothiazide (HYZAAR) 100-12.5 MG tablet Take 1 tablet by mouth daily, Historical         STOP taking these medications       azithromycin (ZITHROMAX) 250 MG tablet Comments:   Reason for Stopping:         ibuprofen (ADVIL/MOTRIN) 200 MG tablet Comments:   Reason for Stopping:             Allergies   No Known Allergies  Data   Most Recent 3 CBC's:  Recent Labs   Lab Test 11/08/20  0556 11/07/20  0505 11/06/20  0620   WBC 9.5 9.7 11.3*   HGB 14.6 14.7 14.0   MCV 94 96 97    352 321      Most Recent 3 BMP's:  Recent Labs   Lab Test 11/08/20  0556 11/07/20  0505 11/06/20  0620    138 141   POTASSIUM 4.2 3.8 3.9   CHLORIDE 104 104 106   CO2 25 27 28   BUN 21 20 24   CR 0.72 0.74 0.82   ANIONGAP 8 7 7   OSWALDO 8.5* 8.9 8.6    108* 108*     Most Recent 2 LFT's:  Recent Labs   Lab Test 11/03/20  1206   AST 49*   ALT 35   ALKPHOS 53   BILITOTAL 0.6     Most Recent INR's and Anticoagulation Dosing History:  Anticoagulation Dose History     Recent Dosing and Labs Latest Ref Rng & Units 11/1/2020 11/3/2020 11/4/2020 11/5/2020 11/6/2020 11/7/2020    INR 0.86 -  1.14 1.03 1.01 1.09 1.12 1.18(H) 1.13        Most Recent 3 Troponin's:No lab results found.  Most Recent Cholesterol Panel:No lab results found.  Most Recent 6 Bacteria Isolates From Any Culture (See EPIC Reports for Culture Details):No lab results found.  Most Recent TSH, T4 and A1c Labs:No lab results found.  Results for orders placed or performed during the hospital encounter of 11/03/20   XR Chest Port 1 View    Narrative    PROCEDURE: XR CHEST PORT 1 VW 11/3/2020 12:44 PM    HISTORY: Covid19 + now with oxygen need.    COMPARISONS: 11/1/2020.    TECHNIQUE: Single view.    FINDINGS: Heart is mildly enlarged but is stable. Mild patchy  infiltrates at both lung bases have not changed significantly. No  large confluent infiltrate is seen and there is no pleural effusion.         Impression    IMPRESSION: Bilateral infiltrates consistent with pneumonia, not  significantly changed.    CARLITA BOYER MD

## 2020-11-09 ENCOUNTER — PATIENT OUTREACH (OUTPATIENT)
Dept: CARE COORDINATION | Facility: CLINIC | Age: 65
End: 2020-11-09

## 2020-11-09 NOTE — PROGRESS NOTES
Transitional Care Management Phone Call    Summary of hospitalization:  [facility] discharge summary reviewed    DISCHARGE DIAGNOSIS: COVID-19    DATE OF DISCHARGE: 11-8-2020    Diagnostic Tests/Treatments reviewed.  Follow up needed: none    Post Discharge Medication Reconciliation: discharge medications reconciled, continue medications without change.    Medications reviewed by: by myself    Problems taking medications regularly:  None    Problems adhering to non-medication therapy:  None    Other Healthcare Providers Involved in Patient s Care:         None     Update since discharge: stable. Patient reports breathing improved, no fever. Has not been using oxygen. Cough better. Continues to have loose stools. Is drinking fluids. Dizzy at times, waits it out. Has friend there with him who is also positive.     Questions why a doctor told him they have to keep being retested as the state does not recommend that.     Plan of care communicated with patient    Just a friendly reminder that you appointment is   Next 5 appointments (look out 90 days)    Nov 19, 2020  8:00 AM  SHORT with Christal Tucker PA-C  [facility] ([facility]) [address]  [place] [address] [phone]      .  We encourage you to keep this appointment.    Please remember to bring all of your pills in their bottles (including any vitamins or over the counter pills) with you to your appointment.   The patient indicates understanding of these issues and agrees with the plan of care.   Yes, plans to follow plan of care and keep the scheduled appointment.    Was the patient contacted within the 2 business days or other approved timeframe?  Yes     Was the Medication reconciliation and management done since the patient was discharged? Yes    Ailyn Jo RN  11/9/2020 1:03 PM

## 2020-11-09 NOTE — TELEPHONE ENCOUNTER
"Please notify patient that he is free to return to \"normal life\" once he has completed the full 14 day quarantine and is asymptomatic. We do not require retesting at this time.     Christal Tucker PA-C on 11/9/2020 at 1:54 PM    "

## 2020-11-09 NOTE — PROGRESS NOTES
Clinic Care Coordination Contact  [facility]/Voicemail       Clinical Data: Care Coordinator Outreach  Outreach attempted x 1.  Left message on patient's voicemail with call back information and requested return call.  Plan:Care Coordinator will try to reach patient again in 1-2 business days.

## 2020-11-10 NOTE — PROGRESS NOTES
Clinic Care Coordination Contact  [facility]       Clinical Data: Care Coordinator Outreach  Outreach attempted x 2.   Fast busy, can't leave message.    Plan: Care Coordinator will attempt again.   Ailyn Jo RN on 11/10/2020 at 2:52 PM

## 2020-11-10 NOTE — PROGRESS NOTES
Clinic Care Coordination Contact  Community Health Worker Initial Outreach       Patient said, he is starting to feel better. He is outside enjoying some fresh air today.  He has a follow up on Friday. Only concern is that everyone keeps telling him different information. He said, he is going to stay home for the 10 days after symptoms to be safe. No further outreach

## 2020-11-11 NOTE — PROGRESS NOTES
"Let patient know that no retesting is needed per ELI Tucker. SyPM  mptoms better overall. A little dizzy or \"wobbly\" in mornings especially. No SOB right now. Knows to call if symptoms worsen.  Ailyn Jo RN on 11/11/2020 at 12:13   "

## 2020-11-17 NOTE — PROGRESS NOTES
SUBJECTIVE:     HPI  Remington Cook is a 65 year old male who presents to clinic today for hospital follow up. Hospitalized 11/3/2020-11/8/2020 for pneumonia due to COVID-19 and acute hypoxic respiratory failure. Initially diagnosed with COVID on 11/28/2020 and presented to the ED a few days later due to shortness of breath, fever, and loose stools. Admitted to ICU for vapotherm and weaned to [place] oxygen. [place] x-ray showed bilateral infiltrates so was given Rocephin and azithromycin. Completed remdesivir and dexamethasone as well. Also started on anticoagulation X 30 days from discharge.     Patient is presenting today feeling much improved. States he notices an improvement every day. Is continuing on the Xarelto as previously prescribed. Not having any difficulties in regards to that medication. No continued cough, shortness of breath, fever, GI symptoms. Has completed his full self-quarantine.       Review of Systems   Constitutional: Negative for activity change, appetite change, chills, diaphoresis, fatigue and fever.   HENT: Negative.    Eyes: Negative.    Respiratory: Negative for cough, chest tightness, shortness of breath and wheezing.    Cardiovascular: Negative.    Gastrointestinal: Negative.    Skin: Negative.           PAST MEDICAL HISTORY: No past medical history on file.    PAST SURGICAL HISTORY: No past surgical history on file.    FAMILY HISTORY: No family history on file.    SOCIAL HISTORY:   Social History     Tobacco Use     Smoking status: Former Smoker     Smokeless tobacco: Never Used   Substance Use Topics     Alcohol use: Not Currently      No Known Allergies  Current Outpatient Medications   Medication     acetaminophen (TYLENOL) 500 MG tablet     atenolol (TENORMIN) 50 MG tablet     losartan-hydrochlorothiazide (HYZAAR) 100-12.5 MG tablet     rivaroxaban ANTICOAGULANT (XARELTO ANTICOAGULANT) 10 MG TABS tablet     No current facility-administered medications for this visit.   "        OBJECTIVE:     /82   Pulse 77   Temp 97.4  F (36.3  C)   Resp 12   Ht 1.651 m (5' 5\")   Wt 103.4 kg (228 lb)   SpO2 96%   BMI 37.94 kg/m    Body mass index is 37.94 kg/m .  Physical Exam  General: Pleasant, in no apparent distress.  Eyes: Sclera are white and conjunctiva are clear bilaterally. Lacrimal apparatus free of erythema, edema, and discharge bilaterally.  Ears: External ears without erythema or edema.   Nose: External nose is symmetrical and free of lesions and deformities  Oropharynx: Oral mucosa is pink and without ulcers, nodules, and white patches. Tongue is symmetrical, pink, and without masses or lesions. Pharynx is pink, symmetrical, and without lesions. Uvula is midline. Tonsils are pink, symmetrical, and without edema, ulcers, or exudates, and 1+ bilaterally.  Neck: No cervical lymphadenopathy on inspection and palpation.  Cardiovascular: Regular rate and rhythm with S1 equal to S2. No murmurs, friction rubs, or gallops.   Respiratory: Lungs are resonant and clear to auscultation bilaterally. No wheezes, crackles, or rhonchi.  Psych: Appropriate mood and affect.        ASSESSMENT/PLAN:     1. 2019 novel coronavirus disease (COVID-19)    2. Pneumonia due to 2019 novel coronavirus      Patient is continuing to improve. Offered repeat labs and x-ray today but patient declined as he states he is significantly improved. Encouraged him to return in 2-4 weeks for repeat chest x-ray to ensure resolution of pneumonia. Continue on Xarleto for the full 30 days per hospitalist recommendations. Follow up as needed.       Christal Tucker PA-C  [facility]    "

## 2020-11-19 ENCOUNTER — OFFICE VISIT (OUTPATIENT)
Dept: FAMILY MEDICINE | Facility: OTHER | Age: 65
End: 2020-11-19
Attending: PHYSICIAN ASSISTANT
Payer: MEDICARE

## 2020-11-19 VITALS
SYSTOLIC BLOOD PRESSURE: 138 MMHG | RESPIRATION RATE: 12 BRPM | OXYGEN SATURATION: 96 % | HEART RATE: 77 BPM | DIASTOLIC BLOOD PRESSURE: 82 MMHG | HEIGHT: 65 IN | WEIGHT: 228 LBS | BODY MASS INDEX: 37.99 KG/M2 | TEMPERATURE: 97.4 F

## 2020-11-19 DIAGNOSIS — J12.82 PNEUMONIA DUE TO 2019 NOVEL CORONAVIRUS: ICD-10-CM

## 2020-11-19 DIAGNOSIS — U07.1 2019 NOVEL CORONAVIRUS DISEASE (COVID-19): Primary | ICD-10-CM

## 2020-11-19 DIAGNOSIS — U07.1 PNEUMONIA DUE TO 2019 NOVEL CORONAVIRUS: ICD-10-CM

## 2020-11-19 PROCEDURE — 99213 OFFICE O/P EST LOW 20 MIN: CPT | Performed by: PHYSICIAN ASSISTANT

## 2020-11-19 PROCEDURE — G0463 HOSPITAL OUTPT CLINIC VISIT: HCPCS

## 2020-11-19 ASSESSMENT — ENCOUNTER SYMPTOMS
CARDIOVASCULAR NEGATIVE: 1
GASTROINTESTINAL NEGATIVE: 1
CHEST TIGHTNESS: 0
FEVER: 0
DIAPHORESIS: 0
EYES NEGATIVE: 1
SHORTNESS OF BREATH: 0
ACTIVITY CHANGE: 0
WHEEZING: 0
CHILLS: 0
FATIGUE: 0
COUGH: 0
APPETITE CHANGE: 0

## 2020-11-19 ASSESSMENT — PAIN SCALES - GENERAL: PAINLEVEL: NO PAIN (0)

## 2020-11-19 ASSESSMENT — MIFFLIN-ST. JEOR: SCORE: 1741.08

## 2020-11-19 NOTE — NURSING NOTE
Patient presents to clinic for hospital follow up.  Kaley Hoyos LPN ....................  11/19/2020   8:02 AM

## 2020-11-24 ENCOUNTER — HOSPITAL ENCOUNTER (OUTPATIENT)
Dept: GENERAL RADIOLOGY | Facility: OTHER | Age: 65
End: 2020-11-24
Attending: NURSE PRACTITIONER
Payer: MEDICARE

## 2020-11-24 ENCOUNTER — OFFICE VISIT (OUTPATIENT)
Dept: FAMILY MEDICINE | Facility: OTHER | Age: 65
End: 2020-11-24
Attending: FAMILY MEDICINE
Payer: MEDICARE

## 2020-11-24 VITALS
HEART RATE: 88 BPM | TEMPERATURE: 96.9 F | DIASTOLIC BLOOD PRESSURE: 70 MMHG | BODY MASS INDEX: 38.04 KG/M2 | RESPIRATION RATE: 17 BRPM | OXYGEN SATURATION: 96 % | WEIGHT: 228.6 LBS | SYSTOLIC BLOOD PRESSURE: 130 MMHG

## 2020-11-24 DIAGNOSIS — M25.432 WRIST SWELLING, LEFT: ICD-10-CM

## 2020-11-24 DIAGNOSIS — M77.8 TENDINITIS OF LEFT WRIST: Primary | ICD-10-CM

## 2020-11-24 LAB
ANION GAP SERPL CALCULATED.3IONS-SCNC: 7 MMOL/L (ref 3–14)
BASOPHILS # BLD AUTO: 0.1 10E9/L (ref 0–0.2)
BASOPHILS NFR BLD AUTO: 0.9 %
BUN SERPL-MCNC: 10 MG/DL (ref 7–25)
CALCIUM SERPL-MCNC: 9.1 MG/DL (ref 8.6–10.3)
CHLORIDE SERPL-SCNC: 106 MMOL/L (ref 98–107)
CO2 SERPL-SCNC: 26 MMOL/L (ref 21–31)
CREAT SERPL-MCNC: 0.72 MG/DL (ref 0.7–1.3)
CRP SERPL-MCNC: 8.3 MG/L
D DIMER PPP FEU-MCNC: 0.4 UG/ML FEU (ref 0–0.5)
DIFFERENTIAL METHOD BLD: ABNORMAL
EOSINOPHIL # BLD AUTO: 0.4 10E9/L (ref 0–0.7)
EOSINOPHIL NFR BLD AUTO: 3.1 %
ERYTHROCYTE [DISTWIDTH] IN BLOOD BY AUTOMATED COUNT: 12.6 % (ref 10–15)
GFR SERPL CREATININE-BSD FRML MDRD: >90 ML/MIN/{1.73_M2}
GLUCOSE SERPL-MCNC: 108 MG/DL (ref 70–105)
HCT VFR BLD AUTO: 41.6 % (ref 40–53)
HGB BLD-MCNC: 14.2 G/DL (ref 13.3–17.7)
IMM GRANULOCYTES # BLD: 0.1 10E9/L (ref 0–0.4)
IMM GRANULOCYTES NFR BLD: 0.4 %
INTERPRETATION ECG - MUSE: NORMAL
LYMPHOCYTES # BLD AUTO: 2.2 10E9/L (ref 0.8–5.3)
LYMPHOCYTES NFR BLD AUTO: 18.8 %
MCH RBC QN AUTO: 32.5 PG (ref 26.5–33)
MCHC RBC AUTO-ENTMCNC: 34.1 G/DL (ref 31.5–36.5)
MCV RBC AUTO: 95 FL (ref 78–100)
MONOCYTES # BLD AUTO: 1.1 10E9/L (ref 0–1.3)
MONOCYTES NFR BLD AUTO: 9.8 %
NEUTROPHILS # BLD AUTO: 7.7 10E9/L (ref 1.6–8.3)
NEUTROPHILS NFR BLD AUTO: 67 %
PLATELET # BLD AUTO: 234 10E9/L (ref 150–450)
POTASSIUM SERPL-SCNC: 3.7 MMOL/L (ref 3.5–5.1)
RBC # BLD AUTO: 4.37 10E12/L (ref 4.4–5.9)
SODIUM SERPL-SCNC: 139 MMOL/L (ref 134–144)
WBC # BLD AUTO: 11.4 10E9/L (ref 4–11)

## 2020-11-24 PROCEDURE — 99214 OFFICE O/P EST MOD 30 MIN: CPT | Performed by: NURSE PRACTITIONER

## 2020-11-24 PROCEDURE — G0463 HOSPITAL OUTPT CLINIC VISIT: HCPCS

## 2020-11-24 PROCEDURE — 36415 COLL VENOUS BLD VENIPUNCTURE: CPT | Mod: ZL | Performed by: NURSE PRACTITIONER

## 2020-11-24 PROCEDURE — 73110 X-RAY EXAM OF WRIST: CPT | Mod: LT

## 2020-11-24 PROCEDURE — 85025 COMPLETE CBC W/AUTO DIFF WBC: CPT | Mod: ZL | Performed by: NURSE PRACTITIONER

## 2020-11-24 PROCEDURE — 86140 C-REACTIVE PROTEIN: CPT | Mod: ZL | Performed by: NURSE PRACTITIONER

## 2020-11-24 PROCEDURE — G0463 HOSPITAL OUTPT CLINIC VISIT: HCPCS | Mod: 25

## 2020-11-24 PROCEDURE — 85379 FIBRIN DEGRADATION QUANT: CPT | Mod: ZL | Performed by: NURSE PRACTITIONER

## 2020-11-24 PROCEDURE — 73130 X-RAY EXAM OF HAND: CPT | Mod: LT

## 2020-11-24 PROCEDURE — 80048 BASIC METABOLIC PNL TOTAL CA: CPT | Mod: ZL | Performed by: NURSE PRACTITIONER

## 2020-11-24 ASSESSMENT — PAIN SCALES - GENERAL: PAINLEVEL: EXTREME PAIN (8)

## 2020-11-24 NOTE — NURSING NOTE
"Chief Complaint   Patient presents with     Musculoskeletal Problem     Left hand swelling     Patient states that he took 2 Tylenol and 3 ibuprofen this morning at about 8:30 a.m. for pain, with no relief.    Initial /70 (BP Location: Right arm, Patient Position: Sitting, Cuff Size: Adult Regular)   Pulse 88   Temp 96.9  F (36.1  C) (Tympanic)   Resp 17   Wt 103.7 kg (228 lb 9.6 oz)   SpO2 96%   BMI 38.04 kg/m   Estimated body mass index is 38.04 kg/m  as calculated from the following:    Height as of 11/19/20: 1.651 m (5' 5\").    Weight as of this encounter: 103.7 kg (228 lb 9.6 oz).  Medication Reconciliation: complete    Rosana Black LPN    "

## 2020-11-24 NOTE — PROGRESS NOTES
HPI:    Remington Cook is a 65 year old male  who presents to [facility] today for left hand swelling and pain. The patient was admitted for pneumonia and was COVID positive on 11/3/20 and discharged on 11/8/20. Patient states that he had an IV in his left hand and had blood drawn from his left hand. The patient had a follow up appointment on 11/19/20 with SHARON Carrillo and did not have the swelling in his hand at that time. Patient reports that he noticed his hand was swollen two days ago. The patient has been taking tylenol and ibuprofen for the pain. Patient states that the tylenol did not help with his pain today. The patient is switching between warm and cold applications. He is currently taking xarelto. Denies recent injury to his hand. Patient reports having the most pain in his lateral left wrist. Denies fevers or chills. The patient reports that he had been able to flex and extend his wrist. The patient has limited flexion or extension of his left wrist at today's visit.         History reviewed. No pertinent past medical history.  History reviewed. No pertinent surgical history.  Social History     Tobacco Use     Smoking status: Former Smoker     Smokeless tobacco: Never Used   Substance Use Topics     Alcohol use: Yes     Comment: 1-2 a day     Current Outpatient Medications   Medication Sig Dispense Refill     acetaminophen (TYLENOL) 500 MG tablet Take 1,000 mg by mouth every 6 hours as needed for mild pain       atenolol (TENORMIN) 50 MG tablet Take 50 mg by mouth daily       losartan-hydrochlorothiazide (HYZAAR) 100-12.5 MG tablet Take 1 tablet by mouth daily       rivaroxaban ANTICOAGULANT (XARELTO ANTICOAGULANT) 10 MG TABS tablet Take 1 tablet (10 mg) by mouth daily (with dinner) 30 tablet 0     No Known Allergies      Past medical history, past surgical history, current medications and allergies reviewed and accurate to the best of my knowledge.        ROS:  Refer to HPI    /70 (BP  Location: Right arm, Patient Position: Sitting, Cuff Size: Adult Regular)   Pulse 88   Temp 96.9  F (36.1  C) (Tympanic)   Resp 17   Wt 103.7 kg (228 lb 9.6 oz)   SpO2 96%   BMI 38.04 kg/m      EXAM:  General Appearance: Well appearing male, appropriate appearance for age. No acute distress  Respiratory: normal chest wall and respirations.  Normal effort.  Clear to auscultation bilaterally, no wheezing, crackles or rhonchi.  No increased work of breathing.  No cough appreciated.  Cardiac: RRR with no murmurs   Musculoskeletal:  Limitied flexion or extend left wrist, without exhibiting pain. Able to make a fist with his hand.  Equal movement of bilateral lower extremities.  Normal gait.   Dermatological: Swelling and mild erythema of the dorsal aspect of the left hand extending to the lateral and dorsal aspect of the left wrist.   Patient's left   Psychological: normal affect, alert, oriented, and pleasant.       Labs:  Results for orders placed or performed in visit on 11/24/20   XR Wrist Left G/E 3 Views     Status: None    Narrative    Exam: XR WRIST LEFT G/E 3 VIEWS, XR HAND LT G/E 3 VW     History:Male, age 65 years, Wrist swelling, left    Comparison:  None    Technique: Three views of the left wrist and left hand are submitted.    Findings: Bones are normally mineralized. No evidence of acute or  subacute fracture.  No evidence of dislocation.  Moderate joint space  narrowing and subchondral sclerosis of the distal radius at the  radiocarpal joint. Prominent calcifications are seen along the radial  aspect of the wrist suggesting the possibility of calcific tendinosis  involving the abductor pollicis longus and extensor pollicis brevis  tendons versus calcified joint bodies.           Impression    Impression:  No evidence of acute or subacute bony abnormality.     Large well-circumscribed erosion at the base of the second metacarpal     Calcifications along the radial aspect of the wrist suggesting  the  possibility of calcific tendinosis involving the abductor pollicis  longus and extensor pollicis brevis tendons. Differential diagnosis  also includes the possibility of calcified joint bodies and large  enthesophytes.    Moderate to severe joint space narrowing at the trapezium/first  metacarpal articulation.    Moderate degenerative changes of the radiocarpal joint.    MRI evaluation would better characterize.    SAVANNAH VAIL MD   XR Hand Left G/E 3 Views     Status: None    Narrative    Exam: XR WRIST LEFT G/E 3 VIEWS, XR HAND LT G/E 3 VW     History:Male, age 65 years, Wrist swelling, left    Comparison:  None    Technique: Three views of the left wrist and left hand are submitted.    Findings: Bones are normally mineralized. No evidence of acute or  subacute fracture.  No evidence of dislocation.  Moderate joint space  narrowing and subchondral sclerosis of the distal radius at the  radiocarpal joint. Prominent calcifications are seen along the radial  aspect of the wrist suggesting the possibility of calcific tendinosis  involving the abductor pollicis longus and extensor pollicis brevis  tendons versus calcified joint bodies.           Impression    Impression:  No evidence of acute or subacute bony abnormality.     Large well-circumscribed erosion at the base of the second metacarpal     Calcifications along the radial aspect of the wrist suggesting the  possibility of calcific tendinosis involving the abductor pollicis  longus and extensor pollicis brevis tendons. Differential diagnosis  also includes the possibility of calcified joint bodies and large  enthesophytes.    Moderate to severe joint space narrowing at the trapezium/first  metacarpal articulation.    Moderate degenerative changes of the radiocarpal joint.    MRI evaluation would better characterize.    SAVANNAH VAIL MD   CBC and Differential     Status: Abnormal   Result Value Ref Range    WBC 11.4 (H) 4.0 - 11.0 10e9/L    RBC Count  4.37 (L) 4.4 - 5.9 10e12/L    Hemoglobin 14.2 13.3 - 17.7 g/dL    Hematocrit 41.6 40.0 - 53.0 %    MCV 95 78 - 100 fl    MCH 32.5 26.5 - 33.0 pg    MCHC 34.1 31.5 - 36.5 g/dL    RDW 12.6 10.0 - 15.0 %    Platelet Count 234 150 - 450 10e9/L    Diff Method Automated Method     % Neutrophils 67.0 %    % Lymphocytes 18.8 %    % Monocytes 9.8 %    % Eosinophils 3.1 %    % Basophils 0.9 %    % Immature Granulocytes 0.4 %    Absolute Neutrophil 7.7 1.6 - 8.3 10e9/L    Absolute Lymphocytes 2.2 0.8 - 5.3 10e9/L    Absolute Monocytes 1.1 0.0 - 1.3 10e9/L    Absolute Eosinophils 0.4 0.0 - 0.7 10e9/L    Absolute Basophils 0.1 0.0 - 0.2 10e9/L    Abs Immature Granulocytes 0.1 0 - 0.4 10e9/L   Basic Metabolic Panel     Status: Abnormal   Result Value Ref Range    Sodium 139 134 - 144 mmol/L    Potassium 3.7 3.5 - 5.1 mmol/L    Chloride 106 98 - 107 mmol/L    Carbon Dioxide 26 21 - 31 mmol/L    Anion Gap 7 3 - 14 mmol/L    Glucose 108 (H) 70 - 105 mg/dL    Urea Nitrogen 10 7 - 25 mg/dL    Creatinine 0.72 0.70 - 1.30 mg/dL    GFR Estimate >90 >60 mL/min/[1.73_m2]    GFR Estimate If Black >90 >60 mL/min/[1.73_m2]    Calcium 9.1 8.6 - 10.3 mg/dL   CRP inflammation     Status: None   Result Value Ref Range    CRP Inflammation 8.3 <10.0 mg/L   D dimer quantitative     Status: None   Result Value Ref Range    D Dimer 0.4 0.0 - 0.50 ug/ml FEU             ASSESSMENT/PLAN:  1. Tendinitis of left wrist      2. Wrist swelling, left    - XR Wrist Left G/E 3 Views  - XR Hand Left G/E 3 Views  - CBC and Differential; Future  - Basic Metabolic Panel; Future  - CRP inflammation; Future  - D dimer quantitative; Future  - CBC and Differential  - Basic Metabolic Panel  - CRP inflammation  - D dimer quantitative      Discussed with lab and x ray results with the patient. The final x ray results read by radiology did show calcific tendinosis of the hand and wrist.     The patient's d dimer, CRP and BMP were within normal limits. The patient's WBC was  mildly elevated at 11.4.   The patient was instructed to Ace wrap his left hand and wrist, elevate, rest and ice the affected area.        The patient was instructed to follow up with his PCP if his symptoms worsen or do not resolve over the next couple of weeks.     May use over-the-counter Tylenol or ibuprofen PRN    Discussed warning signs/symptoms indicative of need to f/u    Follow up if symptoms persist or worsen or concerns      I explained my diagnostic considerations and recommendations to the patient, who voiced understanding and agreement with the treatment plan. All questions were answered. We discussed potential side effects of any prescribed or recommended therapies, as well as expectations for response to treatments.    Disclaimer:  This note consists of words and symbols derived from keyboarding, dictation, or using voice recognition software. As a result, there may be errors in the script that have gone undetected. Please consider this when interpreting information found in this note.

## 2022-11-07 ENCOUNTER — HOSPITAL ENCOUNTER (OUTPATIENT)
Dept: GENERAL RADIOLOGY | Facility: OTHER | Age: 67
Discharge: HOME | End: 2022-11-07
Attending: NURSE PRACTITIONER
Payer: MEDICARE

## 2022-11-07 ENCOUNTER — OFFICE VISIT (OUTPATIENT)
Dept: FAMILY MEDICINE | Facility: OTHER | Age: 67
End: 2022-11-07
Attending: PHYSICIAN ASSISTANT
Payer: MEDICARE

## 2022-11-07 VITALS
BODY MASS INDEX: 41.27 KG/M2 | DIASTOLIC BLOOD PRESSURE: 84 MMHG | SYSTOLIC BLOOD PRESSURE: 132 MMHG | WEIGHT: 248 LBS | HEART RATE: 66 BPM | RESPIRATION RATE: 18 BRPM | OXYGEN SATURATION: 95 % | TEMPERATURE: 97.2 F

## 2022-11-07 DIAGNOSIS — R06.02 POST-COVID CHRONIC SHORTNESS OF BREATH: Primary | ICD-10-CM

## 2022-11-07 DIAGNOSIS — R05.3 POST-COVID CHRONIC COUGH: ICD-10-CM

## 2022-11-07 DIAGNOSIS — U09.9 POST-COVID CHRONIC COUGH: ICD-10-CM

## 2022-11-07 DIAGNOSIS — U09.9 POST-COVID CHRONIC SHORTNESS OF BREATH: Primary | ICD-10-CM

## 2022-11-07 DIAGNOSIS — R09.89 CHEST CONGESTION: ICD-10-CM

## 2022-11-07 LAB
ANION GAP SERPL CALCULATED.3IONS-SCNC: 7 MMOL/L (ref 3–14)
BASOPHILS # BLD AUTO: 0.1 10E3/UL (ref 0–0.2)
BASOPHILS NFR BLD AUTO: 1 %
BUN SERPL-MCNC: 17 MG/DL (ref 7–25)
CALCIUM SERPL-MCNC: 9.4 MG/DL (ref 8.6–10.3)
CHLORIDE BLD-SCNC: 105 MMOL/L (ref 98–107)
CO2 SERPL-SCNC: 27 MMOL/L (ref 21–31)
CREAT SERPL-MCNC: 1.02 MG/DL (ref 0.7–1.3)
EOSINOPHIL # BLD AUTO: 0.4 10E3/UL (ref 0–0.7)
EOSINOPHIL NFR BLD AUTO: 4 %
ERYTHROCYTE [DISTWIDTH] IN BLOOD BY AUTOMATED COUNT: 12.6 % (ref 10–15)
GFR SERPL CREATININE-BSD FRML MDRD: 80 ML/MIN/1.73M2
GLUCOSE BLD-MCNC: 96 MG/DL (ref 70–105)
HCT VFR BLD AUTO: 43.2 % (ref 40–53)
HGB BLD-MCNC: 15.3 G/DL (ref 13.3–17.7)
IMM GRANULOCYTES # BLD: 0 10E3/UL
IMM GRANULOCYTES NFR BLD: 0 %
LYMPHOCYTES # BLD AUTO: 3 10E3/UL (ref 0.8–5.3)
LYMPHOCYTES NFR BLD AUTO: 33 %
MCH RBC QN AUTO: 33.6 PG (ref 26.5–33)
MCHC RBC AUTO-ENTMCNC: 35.4 G/DL (ref 31.5–36.5)
MCV RBC AUTO: 95 FL (ref 78–100)
MONOCYTES # BLD AUTO: 0.8 10E3/UL (ref 0–1.3)
MONOCYTES NFR BLD AUTO: 9 %
NEUTROPHILS # BLD AUTO: 4.8 10E3/UL (ref 1.6–8.3)
NEUTROPHILS NFR BLD AUTO: 53 %
NRBC # BLD AUTO: 0 10E3/UL
NRBC BLD AUTO-RTO: 0 /100
NT-PROBNP SERPL-MCNC: 40 PG/ML (ref 0–100)
PLATELET # BLD AUTO: 249 10E3/UL (ref 150–450)
POTASSIUM BLD-SCNC: 3.7 MMOL/L (ref 3.5–5.1)
RBC # BLD AUTO: 4.55 10E6/UL (ref 4.4–5.9)
SODIUM SERPL-SCNC: 139 MMOL/L (ref 134–144)
WBC # BLD AUTO: 9.1 10E3/UL (ref 4–11)

## 2022-11-07 PROCEDURE — 80048 BASIC METABOLIC PNL TOTAL CA: CPT | Mod: ZL | Performed by: NURSE PRACTITIONER

## 2022-11-07 PROCEDURE — 85004 AUTOMATED DIFF WBC COUNT: CPT | Mod: ZL | Performed by: NURSE PRACTITIONER

## 2022-11-07 PROCEDURE — G0463 HOSPITAL OUTPT CLINIC VISIT: HCPCS | Mod: 25

## 2022-11-07 PROCEDURE — 83880 ASSAY OF NATRIURETIC PEPTIDE: CPT | Mod: ZL | Performed by: NURSE PRACTITIONER

## 2022-11-07 PROCEDURE — G0463 HOSPITAL OUTPT CLINIC VISIT: HCPCS

## 2022-11-07 PROCEDURE — 36415 COLL VENOUS BLD VENIPUNCTURE: CPT | Mod: ZL | Performed by: NURSE PRACTITIONER

## 2022-11-07 PROCEDURE — 71046 X-RAY EXAM CHEST 2 VIEWS: CPT

## 2022-11-07 PROCEDURE — 99214 OFFICE O/P EST MOD 30 MIN: CPT | Performed by: NURSE PRACTITIONER

## 2022-11-07 RX ORDER — TRIAMCINOLONE ACETONIDE 55 UG/1
1 SPRAY, METERED NASAL
COMMUNITY
Start: 2022-02-04 | End: 2024-05-20

## 2022-11-07 RX ORDER — BUDESONIDE AND FORMOTEROL FUMARATE DIHYDRATE 160; 4.5 UG/1; UG/1
1 AEROSOL RESPIRATORY (INHALATION) 2 TIMES DAILY
Qty: 10 G | Refills: 0 | Status: SHIPPED | OUTPATIENT
Start: 2022-11-07

## 2022-11-07 ASSESSMENT — PAIN SCALES - GENERAL: PAINLEVEL: NO PAIN (0)

## 2022-11-07 NOTE — PROGRESS NOTES
ASSESSMENT/PLAN:     I have reviewed the nursing notes.  I have reviewed the findings, diagnosis, plan and need for follow up with the patient.        1. Post-COVID chronic shortness of breath    - XR Chest 2 Views  - CBC and Differential  - Basic Metabolic Panel  - Brain Natriuretic Peptide (BNP)  - budesonide-formoterol (SYMBICORT) 160-4.5 MCG/ACT Inhaler; Inhale 1 puff into the lungs 2 times daily  Dispense: 10 g; Refill: 0      Discussed with patient xray results and labs, reassurance provided.  CXR completed and reviewed, radiologist over read:  Right basilar streaky opacity is likely atelectasis or scarring. No other focal airspace opacities.      CBC normal today   BMP normal today  BNP non-elevated today    Patient instructed to trial combination inhaler to see if this helps improve his chronic cough and shortness of breath.  Patient instructed to follow-up with his primary care provider in the next 3 to 4 weeks for reevaluation and management.      2. Chest congestion    - XR Chest 2 Views    3. Post-COVID chronic cough    - XR Chest 2 Views              I explained my diagnostic considerations and recommendations to the patient, who voiced understanding and agreement with the treatment plan. All questions were answered. We discussed potential side effects of any prescribed or recommended therapies, as well as expectations for response to treatments.    Debora Vick NP  [facility] AND [facility]      SUBJECTIVE:   Remington Cook is a 67 year old male who presents to clinic today for the following health issues:  Shortness of breath    HPI  States he has had cough, chest congestion and shortness of breath for the past 2 years since having Covid.  States it feels like he needs to cough up phlegm but is unable.  States symptoms worse at night and better during daytime.  States he becomes short of breath with laying down, sits up and it improves.  Shortness of breath with activity - chronic.  No chest  tightness, heaviness or chest pain.  States symptoms have been worsening for the past month since he got multiple vaccines taken together on 10/13/22 including influenza, Covid, and pneumococcal.  Minimal nasal congestion or drainage.    Patient was treated with Doxycycline and Prednisone 2/4/22 for sinusitis.  Tried Flonase and Afrin spray.    He receives his primary care in [facility].   He now lives in [place] for the past 4 years.    Hx of severe covid with hospitalization for Covid, 11/1/2020.  He was previously on Xarelto for a month while he had covid, took for 1 month.        History reviewed. No pertinent past medical history.  History reviewed. No pertinent surgical history.  Social History     Tobacco Use     Smoking status: Former     Smokeless tobacco: Never   Substance Use Topics     Alcohol use: Yes     Comment: 1-2 a day     Current Outpatient Medications   Medication Sig Dispense Refill     acetaminophen (TYLENOL) 500 MG tablet Take 1,000 mg by mouth every 6 hours as needed for mild pain       atenolol (TENORMIN) 50 MG tablet Take 50 mg by mouth daily       losartan-hydrochlorothiazide (HYZAAR) 100-12.5 MG tablet Take 1 tablet by mouth daily       rivaroxaban ANTICOAGULANT (XARELTO ANTICOAGULANT) 10 MG TABS tablet Take 1 tablet (10 mg) by mouth daily (with dinner) (Patient not taking: Reported on 11/7/2022) 30 tablet 0     triamcinolone (NASACORT) 55 MCG/ACT nasal aerosol Spray 1 spray in nostril (Patient not taking: Reported on 11/7/2022)       No Known Allergies      Past medical history, past surgical history, current medications and allergies reviewed and accurate to the best of my knowledge.        OBJECTIVE:     /84   Pulse 66   Temp 97.2  F (36.2  C) (Tympanic)   Resp 18   Wt 112.5 kg (248 lb)   SpO2 95%   BMI 41.27 kg/m    Body mass index is 41.27 kg/m .     Physical Exam  General Appearance: Well appearing adult male, appropriate appearance for age. No acute distress   Nose:  No  noted drainage or congestion   Neck: supple without adenopathy  Respiratory: normal chest wall and respirations.  Normal effort.  Clear with minimal wheezing to auscultation bilaterally.    Musculoskeletal:  Equal movement of bilateral upper extremities.  Equal movement of bilateral lower extremities.  Normal gait.   Psychological: normal affect, alert, oriented, and pleasant.       Imaging and Labs:  Results for orders placed or performed in visit on 11/07/22   XR Chest 2 Views     Status: None    Narrative    Exam:  XR CHEST 2 VIEWS    HISTORY: Post-COVID chronic shortness of breath; Post-COVID chronic  shortness of breath; Chest congestion; Post-COVID chronic cough;  Post-COVID chronic cough.    COMPARISON:  11/3/2020, 11/1/2020    FINDINGS:     The cardiomediastinal contours are normal.      There is right basilar streaky opacity. No pleural effusion or  pneumothorax.      No acute osseous abnormality.       Impression    IMPRESSION:      Right basilar streaky opacity is likely atelectasis or scarring. No  other focal airspace opacities.      GEOFFREY LOPEZ MD         SYSTEM ID:  [id number]   Basic Metabolic Panel     Status: Normal   Result Value Ref Range    Sodium 139 134 - 144 mmol/L    Potassium 3.7 3.5 - 5.1 mmol/L    Chloride 105 98 - 107 mmol/L    Carbon Dioxide (CO2) 27 21 - 31 mmol/L    Anion Gap 7 3 - 14 mmol/L    Urea Nitrogen 17 7 - 25 mg/dL    Creatinine 1.02 0.70 - 1.30 mg/dL    Calcium 9.4 8.6 - 10.3 mg/dL    Glucose 96 70 - 105 mg/dL    GFR Estimate 80 >60 mL/min/1.73m2   Brain Natriuretic Peptide (BNP)     Status: Normal   Result Value Ref Range    N Terminal Pro BNP Outpatient 40 0 - 100 pg/mL   CBC with platelets and differential     Status: Abnormal   Result Value Ref Range    WBC Count 9.1 4.0 - 11.0 10e3/uL    RBC Count 4.55 4.40 - 5.90 10e6/uL    Hemoglobin 15.3 13.3 - 17.7 g/dL    Hematocrit 43.2 40.0 - 53.0 %    MCV 95 78 - 100 fL    MCH 33.6 (H) 26.5 - 33.0 pg    MCHC 35.4 31.5 - 36.5  g/dL    RDW 12.6 10.0 - 15.0 %    Platelet Count 249 150 - 450 10e3/uL    % Neutrophils 53 %    % Lymphocytes 33 %    % Monocytes 9 %    % Eosinophils 4 %    % Basophils 1 %    % Immature Granulocytes 0 %    NRBCs per 100 WBC 0 <1 /100    Absolute Neutrophils 4.8 1.6 - 8.3 10e3/uL    Absolute Lymphocytes 3.0 0.8 - 5.3 10e3/uL    Absolute Monocytes 0.8 0.0 - 1.3 10e3/uL    Absolute Eosinophils 0.4 0.0 - 0.7 10e3/uL    Absolute Basophils 0.1 0.0 - 0.2 10e3/uL    Absolute Immature Granulocytes 0.0 <=0.4 10e3/uL    Absolute NRBCs 0.0 10e3/uL   CBC and Differential     Status: Abnormal    Narrative    The following orders were created for panel order CBC and Differential.  Procedure                               Abnormality         Status                     ---------                               -----------         ------                     CBC with platelets and d...[[id number]]  Abnormal            Final result                 Please view results for these tests on the individual orders.

## 2022-11-07 NOTE — NURSING NOTE
"Chief Complaint   Patient presents with     chest congestion     For 2 years since he had Covid     He said he has had chest congestion and shortness of breath since he had covid 2 tears ago. It feels like he needs to cough up something but he can not. He says it happens at night and he feels better in the day time.  Adri Travis LPN..................11/7/2022   11:09 AM    Initial /84   Pulse 66   Temp 97.2  F (36.2  C) (Tympanic)   Resp 18   Wt 112.5 kg (248 lb)   SpO2 95%   BMI 41.27 kg/m   Estimated body mass index is 41.27 kg/m  as calculated from the following:    Height as of 11/19/20: 1.651 m (5' 5\").    Weight as of this encounter: 112.5 kg (248 lb).  Medication Reconciliation: complete    FOOD SECURITY SCREENING QUESTIONS  Hunger Vital Signs:  Within the past 12 months we worried whether our food would run out before we got money to buy more. Never  Within the past 12 months the food we bought just didn't last and we didn't have money to get more. Never        Advance care directive on file? no  Advance care directive provided to patient? States he has one     Adri Travis LPN  "

## 2022-12-19 ENCOUNTER — TRANSFERRED RECORDS (OUTPATIENT)
Dept: MULTI SPECIALTY CLINIC | Facility: CLINIC | Age: 67
End: 2022-12-19

## 2022-12-19 LAB — COLOGUARD-ABSTRACT: NEGATIVE

## 2024-05-20 ENCOUNTER — OFFICE VISIT (OUTPATIENT)
Dept: FAMILY MEDICINE | Facility: OTHER | Age: 69
End: 2024-05-20
Attending: FAMILY MEDICINE
Payer: MEDICARE

## 2024-05-20 ENCOUNTER — HOSPITAL ENCOUNTER (OUTPATIENT)
Dept: GENERAL RADIOLOGY | Facility: OTHER | Age: 69
Discharge: HOME | End: 2024-05-20
Attending: STUDENT IN AN ORGANIZED HEALTH CARE EDUCATION/TRAINING PROGRAM
Payer: MEDICARE

## 2024-05-20 VITALS
BODY MASS INDEX: 37.62 KG/M2 | HEART RATE: 58 BPM | WEIGHT: 234.1 LBS | HEIGHT: 66 IN | SYSTOLIC BLOOD PRESSURE: 122 MMHG | OXYGEN SATURATION: 98 % | DIASTOLIC BLOOD PRESSURE: 82 MMHG | RESPIRATION RATE: 16 BRPM | TEMPERATURE: 97 F

## 2024-05-20 DIAGNOSIS — R06.02 POST-COVID CHRONIC SHORTNESS OF BREATH: Primary | ICD-10-CM

## 2024-05-20 DIAGNOSIS — R06.02 POST-COVID CHRONIC SHORTNESS OF BREATH: ICD-10-CM

## 2024-05-20 DIAGNOSIS — R09.81 NASAL CONGESTION: ICD-10-CM

## 2024-05-20 DIAGNOSIS — U09.9 POST-COVID CHRONIC SHORTNESS OF BREATH: Primary | ICD-10-CM

## 2024-05-20 DIAGNOSIS — U09.9 POST-COVID CHRONIC SHORTNESS OF BREATH: ICD-10-CM

## 2024-05-20 PROCEDURE — 99213 OFFICE O/P EST LOW 20 MIN: CPT | Performed by: STUDENT IN AN ORGANIZED HEALTH CARE EDUCATION/TRAINING PROGRAM

## 2024-05-20 PROCEDURE — 71046 X-RAY EXAM CHEST 2 VIEWS: CPT

## 2024-05-20 PROCEDURE — G0463 HOSPITAL OUTPT CLINIC VISIT: HCPCS | Mod: 25

## 2024-05-20 RX ORDER — AMLODIPINE BESYLATE 2.5 MG/1
2.5 TABLET ORAL DAILY
COMMUNITY
Start: 2024-02-14

## 2024-05-20 RX ORDER — FLUTICASONE PROPIONATE 50 MCG
2 SPRAY, SUSPENSION (ML) NASAL DAILY
COMMUNITY

## 2024-05-20 RX ORDER — BUDESONIDE 0.5 MG/2ML
0.5 INHALANT ORAL 2 TIMES DAILY PRN
Qty: 120 ML | Refills: 0 | Status: SHIPPED | OUTPATIENT
Start: 2024-05-20 | End: 2024-05-24

## 2024-05-20 ASSESSMENT — PAIN SCALES - GENERAL: PAINLEVEL: NO PAIN (0)

## 2024-05-20 NOTE — PATIENT INSTRUCTIONS
Nasal congestion    Nasal saline rinses twice a day, decrease as symptoms improve.    Use budesonide in the solution as needed until congestion improves.    Zyrtec daily.    Stop Flonase for the time being, can restart if this is not helping.    Follow-up with primary care for further evaluation as needed.    Return to rapid clinic or ER if symptoms worsen or change.

## 2024-05-20 NOTE — NURSING NOTE
"Chief Complaint   Patient presents with    Shortness of Breath     Possible Pneumonia x 2 Months        Initial /82 (BP Location: Left arm, Patient Position: Chair, Cuff Size: Adult Large)   Pulse 58   Temp 97  F (36.1  C) (Tympanic)   Resp 16   Ht 1.664 m (5' 5.5\")   Wt 106.2 kg (234 lb 1.6 oz)   SpO2 98%   BMI 38.36 kg/m   Estimated body mass index is 38.36 kg/m  as calculated from the following:    Height as of this encounter: 1.664 m (5' 5.5\").    Weight as of this encounter: 106.2 kg (234 lb 1.6 oz).      Medication Reconciliation: Complete.       Gloria Childers LPN on 5/20/2024 at 12:43 PM     "

## 2024-05-20 NOTE — PROGRESS NOTES
Assessment & Plan     (R06.02,  U09.9) Post-COVID chronic shortness of breath  (primary encounter diagnosis)    Comment: Post COVID shortness of breath.  Most likely due to nasal congestion and postnasal drip.  This is based on history and exam.  Lung sounds are clear, oxygen 98%, not tachycardic.  Chest x-ray without any evidence of consolidation, pleural effusion.  I did personally review the film.  BNP was completed in 2022.  EKG completed in 2022.  Did not repeat these at this time.    Plan: XR Chest 2 Views          Plan to try [place] pot with budesonide.  He was given a clinical handout on how to use a [place] pot.  Stop Flonase at this time.  Follow-up with primary care for persisting symptoms.  Return to rapid clinic or ER for worsening or changing symptoms.  Patient is comfortable and agreeable with this plan.    (R09.81) Nasal congestion  Comment: See above.  Plan: budesonide (PULMICORT) 0.5 MG/2ML neb solution          Pat Macedo is a 68 year old, presenting for the following health issues:  Shortness of Breath (Possible Pneumonia x 2 Months )    HPI     Patient presents today with concerns of persistent shortness of breath.  States it is accompanied by nasal congestion and postnasal drip.  States it is worse with rest, better with activity.  States he will go outside during the day and then come in in the evening and feel more congested and short of breath.  He has been using Flonase with minimal success.  He feels like he cannot lay flat due to the postnasal drip.  He has not had a cough.  He has not had any chest pain.  He feels like this did start initially after having COVID a few years ago.    He does have past medical history of hypertension, no history of COPD or asthma.  No history of cardiac disease.      Review of Systems  Constitutional, HEENT, cardiovascular, pulmonary, gi and gu systems are negative, except as otherwise noted.        Objective    /82 (BP Location: Left arm,  "Patient Position: Chair, Cuff Size: Adult Large)   Pulse 58   Temp 97  F (36.1  C) (Tympanic)   Resp 16   Ht 1.664 m (5' 5.5\")   Wt 106.2 kg (234 lb 1.6 oz)   SpO2 98%   BMI 38.36 kg/m    Body mass index is 38.36 kg/m .    Physical Exam   GENERAL: alert and no distress  EYES: Eyes grossly normal to inspection, PERRL and conjunctivae and sclerae normal  HENT: ear canals and TM's normal, nose and mouth without ulcers or lesions  NECK: no adenopathy, no asymmetry, masses, or scars  RESP: lungs clear to auscultation - no rales, rhonchi or wheezes  CV: regular rate and rhythm, normal S1 S2, no S3 or S4, no murmur, click or rub, no peripheral edema  MS: no gross musculoskeletal defects noted, no edema    Results for orders placed or performed during the hospital encounter of 05/20/24   XR Chest 2 Views     Status: None    Narrative    PROCEDURE:  XR CHEST 2 VIEWS    HISTORY:  intermittent SOB; Post-COVID chronic shortness of breath;  Post-COVID chronic shortness of breath.     COMPARISON:  2022    FINDINGS:   The cardiac silhouette is normal in size. The pulmonary vasculature is  normal.  The lungs are clear. No pleural effusion or pneumothorax.      Impression    IMPRESSION:  No acute cardiopulmonary disease.      DAVID LEWIS MD         SYSTEM ID:  [id number]         Signed Electronically by: Portia Yoder PA-C    "

## 2024-05-23 ENCOUNTER — TELEPHONE (OUTPATIENT)
Dept: FAMILY MEDICINE | Facility: OTHER | Age: 69
End: 2024-05-23
Payer: MEDICARE

## 2024-05-23 DIAGNOSIS — R09.81 NASAL CONGESTION: ICD-10-CM

## 2024-05-23 NOTE — TELEPHONE ENCOUNTER
Called and spoke with Debora Arroyo who stated Portia Yoder will be here tomorrow and will address this question then.  [facility] is asking to clarify if this should be a nasal spray or if she wants it as a nebulizer solution as ordered?  The sig states to spray 2 mls in nostril 2 times daily as needed. Will await Portia's response to clarification.  Yasmin Chew RN on 5/23/2024 at 5:16 PM

## 2024-05-23 NOTE — TELEPHONE ENCOUNTER
"Received a RX clarification request from [facility] for Budesonide 0.5 mg/2 ml inhalation suspension (Pulmicort) Qty 120 ml neb solution    Last Office Visit from Portia Yoder states: \"Plan to try [place] pot with budesonide. He was given a clinical handout on how to use a [place] pot.\"       Yasmin Chew RN on 5/23/2024 at 8:59 AM    "

## 2024-05-24 RX ORDER — BUDESONIDE 0.5 MG/2ML
0.5 INHALANT ORAL 2 TIMES DAILY PRN
Qty: 120 ML | Refills: 0 | Status: SHIPPED | OUTPATIENT
Start: 2024-05-24

## 2024-05-24 NOTE — TELEPHONE ENCOUNTER
He should add vials to neti-pot solution and rinse 1-2 times a day. A new Rx was sent to [facility] pharmacy with clarification.

## (undated) RX ORDER — CEFTRIAXONE SODIUM 1 G/50ML
INJECTION, SOLUTION INTRAVENOUS
Status: DISPENSED
Start: 2020-11-01

## (undated) RX ORDER — AZITHROMYCIN 250 MG/1
TABLET, FILM COATED ORAL
Status: DISPENSED
Start: 2020-11-01

## (undated) RX ORDER — LANOLIN ALCOHOL/MO/W.PET/CERES
CREAM (GRAM) TOPICAL
Status: DISPENSED
Start: 2020-11-04

## (undated) RX ORDER — ACETAMINOPHEN 325 MG/1
TABLET ORAL
Status: DISPENSED
Start: 2020-11-03

## (undated) RX ORDER — DEXAMETHASONE SODIUM PHOSPHATE 10 MG/ML
INJECTION, SOLUTION INTRAMUSCULAR; INTRAVENOUS
Status: DISPENSED
Start: 2020-11-01

## (undated) RX ORDER — CEFTRIAXONE SODIUM 1 G
VIAL (EA) INJECTION
Status: DISPENSED
Start: 2020-11-01

## (undated) RX ORDER — DEXAMETHASONE SODIUM PHOSPHATE 4 MG/ML
INJECTION, SOLUTION INTRA-ARTICULAR; INTRALESIONAL; INTRAMUSCULAR; INTRAVENOUS; SOFT TISSUE
Status: DISPENSED
Start: 2020-11-03

## (undated) RX ORDER — SODIUM CHLORIDE 9 MG/ML
INJECTION, SOLUTION INTRAVENOUS
Status: DISPENSED
Start: 2020-11-01